# Patient Record
Sex: MALE | Race: WHITE | NOT HISPANIC OR LATINO | Employment: OTHER | URBAN - METROPOLITAN AREA
[De-identification: names, ages, dates, MRNs, and addresses within clinical notes are randomized per-mention and may not be internally consistent; named-entity substitution may affect disease eponyms.]

---

## 2021-04-13 DIAGNOSIS — Z23 ENCOUNTER FOR IMMUNIZATION: ICD-10-CM

## 2023-04-14 ENCOUNTER — APPOINTMENT (EMERGENCY)
Dept: RADIOLOGY | Facility: HOSPITAL | Age: 78
DRG: 872 | End: 2023-04-14
Payer: MEDICARE

## 2023-04-14 ENCOUNTER — HOSPITAL ENCOUNTER (INPATIENT)
Facility: HOSPITAL | Age: 78
LOS: 1 days | Discharge: HOME | DRG: 872 | End: 2023-04-16
Attending: EMERGENCY MEDICINE | Admitting: HOSPITALIST
Payer: MEDICARE

## 2023-04-14 DIAGNOSIS — R00.0 TACHYCARDIA: ICD-10-CM

## 2023-04-14 DIAGNOSIS — R50.9 FEVER, UNKNOWN ORIGIN: Primary | ICD-10-CM

## 2023-04-14 PROBLEM — N40.0 BPH (BENIGN PROSTATIC HYPERPLASIA): Status: ACTIVE | Noted: 2023-04-14

## 2023-04-14 PROBLEM — Z86.19 HISTORY OF HERPES LABIALIS: Status: ACTIVE | Noted: 2023-04-14

## 2023-04-14 PROBLEM — K52.9 ENTERITIS: Status: ACTIVE | Noted: 2023-04-14

## 2023-04-14 PROBLEM — N41.9 PROSTATITIS: Status: ACTIVE | Noted: 2021-07-01

## 2023-04-14 PROBLEM — N17.9 SEPSIS WITH ACUTE RENAL FAILURE (CMS/HCC): Status: ACTIVE | Noted: 2023-04-14

## 2023-04-14 PROBLEM — K21.9 GERD (GASTROESOPHAGEAL REFLUX DISEASE): Status: ACTIVE | Noted: 2023-04-14

## 2023-04-14 PROBLEM — A41.9 SEPSIS WITHOUT ACUTE ORGAN DYSFUNCTION (CMS/HCC): Status: ACTIVE | Noted: 2023-04-14

## 2023-04-14 PROBLEM — R03.0 ELEVATED BLOOD PRESSURE READING WITHOUT DIAGNOSIS OF HYPERTENSION: Status: ACTIVE | Noted: 2021-03-29

## 2023-04-14 PROBLEM — C61 MALIGNANT NEOPLASM OF PROSTATE (CMS/HCC): Status: ACTIVE | Noted: 2021-07-01

## 2023-04-14 PROBLEM — R65.20 SEPSIS WITH ACUTE RENAL FAILURE (CMS/HCC): Status: ACTIVE | Noted: 2023-04-14

## 2023-04-14 LAB
ALBUMIN SERPL-MCNC: 4.3 G/DL (ref 3.4–5)
ALP SERPL-CCNC: 41 IU/L (ref 35–126)
ALT SERPL-CCNC: 43 IU/L (ref 16–63)
ANION GAP SERPL CALC-SCNC: 10 MEQ/L (ref 3–15)
APTT PPP: 28 SEC (ref 23–35)
AST SERPL-CCNC: 33 IU/L (ref 15–41)
BASOPHILS # BLD: 0.02 K/UL (ref 0.01–0.1)
BASOPHILS NFR BLD: 0.2 %
BILIRUB SERPL-MCNC: 1 MG/DL (ref 0.3–1.2)
BILIRUB UR QL STRIP.AUTO: NEGATIVE MG/DL
BUN SERPL-MCNC: 12 MG/DL (ref 8–20)
CALCIUM SERPL-MCNC: 9.2 MG/DL (ref 8.9–10.3)
CHLORIDE SERPL-SCNC: 100 MEQ/L (ref 98–109)
CLARITY UR REFRACT.AUTO: CLEAR
CO2 SERPL-SCNC: 26 MEQ/L (ref 22–32)
COLOR UR AUTO: YELLOW
CREAT SERPL-MCNC: 1.3 MG/DL (ref 0.8–1.3)
DIFFERENTIAL METHOD BLD: ABNORMAL
EOSINOPHIL # BLD: 0.05 K/UL (ref 0.04–0.54)
EOSINOPHIL NFR BLD: 0.5 %
ERYTHROCYTE [DISTWIDTH] IN BLOOD BY AUTOMATED COUNT: 14 % (ref 11.6–14.4)
FLUAV RNA SPEC QL NAA+PROBE: NEGATIVE
FLUBV RNA SPEC QL NAA+PROBE: NEGATIVE
GFR SERPL CREATININE-BSD FRML MDRD: 53.5 ML/MIN/1.73M*2
GLUCOSE SERPL-MCNC: 125 MG/DL (ref 70–99)
GLUCOSE UR STRIP.AUTO-MCNC: NEGATIVE MG/DL
HCT VFR BLDCO AUTO: 53.4 % (ref 40.1–51)
HGB BLD-MCNC: 18 G/DL (ref 13.7–17.5)
HGB UR QL STRIP.AUTO: NEGATIVE
IMM GRANULOCYTES # BLD AUTO: 0.03 K/UL (ref 0–0.08)
IMM GRANULOCYTES NFR BLD AUTO: 0.3 %
INR PPP: 1.1
KETONES UR STRIP.AUTO-MCNC: NEGATIVE MG/DL
LACTATE SERPL-SCNC: 1.1 MMOL/L (ref 0.4–2)
LEUKOCYTE ESTERASE UR QL STRIP.AUTO: NEGATIVE
LYMPHOCYTES # BLD: 0.7 K/UL (ref 1.2–3.5)
LYMPHOCYTES NFR BLD: 7 %
MAGNESIUM SERPL-MCNC: 1.9 MG/DL (ref 1.8–2.5)
MCH RBC QN AUTO: 32.5 PG (ref 28–33.2)
MCHC RBC AUTO-ENTMCNC: 33.7 G/DL (ref 32.2–36.5)
MCV RBC AUTO: 96.4 FL (ref 83–98)
MONOCYTES # BLD: 0.63 K/UL (ref 0.3–1)
MONOCYTES NFR BLD: 6.3 %
NEUTROPHILS # BLD: 8.62 K/UL (ref 1.7–7)
NEUTS SEG NFR BLD: 85.7 %
NITRITE UR QL STRIP.AUTO: NEGATIVE
NRBC BLD-RTO: 0 %
PDW BLD AUTO: 8.8 FL (ref 9.4–12.4)
PH UR STRIP.AUTO: 7 [PH]
PLATELET # BLD AUTO: 181 K/UL (ref 150–350)
POTASSIUM SERPL-SCNC: 3.8 MEQ/L (ref 3.6–5.1)
PROT SERPL-MCNC: 7.2 G/DL (ref 6–8.2)
PROT UR QL STRIP.AUTO: NEGATIVE
PROTHROMBIN TIME: 14 SEC (ref 12.2–14.5)
RBC # BLD AUTO: 5.54 M/UL (ref 4.5–5.8)
RSV RNA SPEC QL NAA+PROBE: NEGATIVE
SARS-COV-2 RNA RESP QL NAA+PROBE: NEGATIVE
SODIUM SERPL-SCNC: 136 MEQ/L (ref 136–144)
SP GR UR REFRACT.AUTO: 1.02
TROPONIN I SERPL HS-MCNC: 7 PG/ML
TROPONIN I SERPL HS-MCNC: 9 PG/ML
TSH SERPL DL<=0.05 MIU/L-ACNC: 1.3 MIU/L (ref 0.34–5.6)
UROBILINOGEN UR STRIP-ACNC: 0.2 EU/DL
WBC # BLD AUTO: 10.05 K/UL (ref 3.8–10.5)

## 2023-04-14 PROCEDURE — 87637 SARSCOV2&INF A&B&RSV AMP PRB: CPT

## 2023-04-14 PROCEDURE — 83605 ASSAY OF LACTIC ACID: CPT

## 2023-04-14 PROCEDURE — G0378 HOSPITAL OBSERVATION PER HR: HCPCS

## 2023-04-14 PROCEDURE — 83735 ASSAY OF MAGNESIUM: CPT

## 2023-04-14 PROCEDURE — 63600000 HC DRUGS/DETAIL CODE

## 2023-04-14 PROCEDURE — 63600000 HC DRUGS/DETAIL CODE: Performed by: HOSPITALIST

## 2023-04-14 PROCEDURE — 25800000 HC PHARMACY IV SOLUTIONS: Performed by: HOSPITALIST

## 2023-04-14 PROCEDURE — 84443 ASSAY THYROID STIM HORMONE: CPT

## 2023-04-14 PROCEDURE — 84484 ASSAY OF TROPONIN QUANT: CPT | Mod: 91

## 2023-04-14 PROCEDURE — G1004 CDSM NDSC: HCPCS

## 2023-04-14 PROCEDURE — 96361 HYDRATE IV INFUSION ADD-ON: CPT

## 2023-04-14 PROCEDURE — 63700000 HC SELF-ADMINISTRABLE DRUG: Performed by: EMERGENCY MEDICINE

## 2023-04-14 PROCEDURE — 63600105 HC IODINE BASED CONTRAST

## 2023-04-14 PROCEDURE — 93005 ELECTROCARDIOGRAM TRACING: CPT | Performed by: HOSPITALIST

## 2023-04-14 PROCEDURE — 93005 ELECTROCARDIOGRAM TRACING: CPT

## 2023-04-14 PROCEDURE — 84484 ASSAY OF TROPONIN QUANT: CPT

## 2023-04-14 PROCEDURE — 96365 THER/PROPH/DIAG IV INF INIT: CPT | Performed by: HOSPITALIST

## 2023-04-14 PROCEDURE — 85025 COMPLETE CBC W/AUTO DIFF WBC: CPT

## 2023-04-14 PROCEDURE — 36415 COLL VENOUS BLD VENIPUNCTURE: CPT

## 2023-04-14 PROCEDURE — 99222 1ST HOSP IP/OBS MODERATE 55: CPT | Performed by: HOSPITALIST

## 2023-04-14 PROCEDURE — 81003 URINALYSIS AUTO W/O SCOPE: CPT

## 2023-04-14 PROCEDURE — 96375 TX/PRO/DX INJ NEW DRUG ADDON: CPT | Mod: 59

## 2023-04-14 PROCEDURE — 93970 EXTREMITY STUDY: CPT

## 2023-04-14 PROCEDURE — 96367 TX/PROPH/DG ADDL SEQ IV INF: CPT | Performed by: HOSPITALIST

## 2023-04-14 PROCEDURE — 80053 COMPREHEN METABOLIC PANEL: CPT

## 2023-04-14 PROCEDURE — 25800000 HC PHARMACY IV SOLUTIONS

## 2023-04-14 PROCEDURE — 85610 PROTHROMBIN TIME: CPT

## 2023-04-14 PROCEDURE — 99284 EMERGENCY DEPT VISIT MOD MDM: CPT | Mod: 25

## 2023-04-14 PROCEDURE — 63700000 HC SELF-ADMINISTRABLE DRUG: Performed by: HOSPITALIST

## 2023-04-14 PROCEDURE — 87040 BLOOD CULTURE FOR BACTERIA: CPT | Performed by: HOSPITALIST

## 2023-04-14 PROCEDURE — 96366 THER/PROPH/DIAG IV INF ADDON: CPT | Performed by: HOSPITALIST

## 2023-04-14 PROCEDURE — 85730 THROMBOPLASTIN TIME PARTIAL: CPT

## 2023-04-14 RX ORDER — TAMSULOSIN HYDROCHLORIDE 0.4 MG/1
0.4 CAPSULE ORAL NIGHTLY
Status: DISCONTINUED | OUTPATIENT
Start: 2023-04-14 | End: 2023-04-16 | Stop reason: HOSPADM

## 2023-04-14 RX ORDER — IBUPROFEN 200 MG
16-32 TABLET ORAL AS NEEDED
Status: DISCONTINUED | OUTPATIENT
Start: 2023-04-14 | End: 2023-04-16 | Stop reason: HOSPADM

## 2023-04-14 RX ORDER — SENNOSIDES 8.6 MG/1
1 TABLET ORAL 2 TIMES DAILY PRN
Status: DISCONTINUED | OUTPATIENT
Start: 2023-04-14 | End: 2023-04-16 | Stop reason: HOSPADM

## 2023-04-14 RX ORDER — OXYBUTYNIN CHLORIDE 5 MG/1
5 TABLET, EXTENDED RELEASE ORAL DAILY
Status: DISCONTINUED | OUTPATIENT
Start: 2023-04-15 | End: 2023-04-16 | Stop reason: HOSPADM

## 2023-04-14 RX ORDER — FAMOTIDINE 20 MG/1
20 TABLET, FILM COATED ORAL NIGHTLY
Status: DISCONTINUED | OUTPATIENT
Start: 2023-04-14 | End: 2023-04-16 | Stop reason: HOSPADM

## 2023-04-14 RX ORDER — ACETAMINOPHEN 325 MG/1
975 TABLET ORAL ONCE
Status: COMPLETED | OUTPATIENT
Start: 2023-04-14 | End: 2023-04-14

## 2023-04-14 RX ORDER — SODIUM CHLORIDE, SODIUM LACTATE, POTASSIUM CHLORIDE, CALCIUM CHLORIDE 600; 310; 30; 20 MG/100ML; MG/100ML; MG/100ML; MG/100ML
INJECTION, SOLUTION INTRAVENOUS CONTINUOUS
Status: ACTIVE | OUTPATIENT
Start: 2023-04-14 | End: 2023-04-15

## 2023-04-14 RX ORDER — ALUMINUM HYDROXIDE, MAGNESIUM HYDROXIDE, AND SIMETHICONE 1200; 120; 1200 MG/30ML; MG/30ML; MG/30ML
30 SUSPENSION ORAL EVERY 4 HOURS PRN
Status: DISCONTINUED | OUTPATIENT
Start: 2023-04-14 | End: 2023-04-16 | Stop reason: HOSPADM

## 2023-04-14 RX ORDER — ACYCLOVIR 800 MG/1
800 TABLET ORAL DAILY
Status: DISCONTINUED | OUTPATIENT
Start: 2023-04-15 | End: 2023-04-16 | Stop reason: HOSPADM

## 2023-04-14 RX ORDER — ACETAMINOPHEN 325 MG/1
650 TABLET ORAL EVERY 4 HOURS PRN
Status: DISCONTINUED | OUTPATIENT
Start: 2023-04-14 | End: 2023-04-16 | Stop reason: HOSPADM

## 2023-04-14 RX ORDER — DEXTROSE 40 %
15-30 GEL (GRAM) ORAL AS NEEDED
Status: DISCONTINUED | OUTPATIENT
Start: 2023-04-14 | End: 2023-04-16 | Stop reason: HOSPADM

## 2023-04-14 RX ORDER — ONDANSETRON 4 MG/1
4 TABLET, ORALLY DISINTEGRATING ORAL EVERY 6 HOURS PRN
Status: DISCONTINUED | OUTPATIENT
Start: 2023-04-14 | End: 2023-04-16 | Stop reason: HOSPADM

## 2023-04-14 RX ORDER — ONDANSETRON HYDROCHLORIDE 2 MG/ML
4 INJECTION, SOLUTION INTRAVENOUS EVERY 6 HOURS PRN
Status: DISCONTINUED | OUTPATIENT
Start: 2023-04-14 | End: 2023-04-16 | Stop reason: HOSPADM

## 2023-04-14 RX ORDER — TADALAFIL 20 MG/1
20 TABLET ORAL DAILY PRN
Status: ON HOLD | COMMUNITY
End: 2023-04-14

## 2023-04-14 RX ORDER — ENOXAPARIN SODIUM 100 MG/ML
40 INJECTION SUBCUTANEOUS
Status: DISCONTINUED | OUTPATIENT
Start: 2023-04-15 | End: 2023-04-16 | Stop reason: HOSPADM

## 2023-04-14 RX ORDER — OXYBUTYNIN CHLORIDE 5 MG/1
5 TABLET, EXTENDED RELEASE ORAL DAILY
COMMUNITY

## 2023-04-14 RX ORDER — DEXTROSE 50 % IN WATER (D50W) INTRAVENOUS SYRINGE
25 AS NEEDED
Status: DISCONTINUED | OUTPATIENT
Start: 2023-04-14 | End: 2023-04-16 | Stop reason: HOSPADM

## 2023-04-14 RX ORDER — DIPHENHYDRAMINE HCL 25 MG
25 CAPSULE ORAL EVERY 6 HOURS PRN
Status: DISCONTINUED | OUTPATIENT
Start: 2023-04-14 | End: 2023-04-16 | Stop reason: HOSPADM

## 2023-04-14 RX ORDER — ONDANSETRON HYDROCHLORIDE 2 MG/ML
4 INJECTION, SOLUTION INTRAVENOUS ONCE
Status: COMPLETED | OUTPATIENT
Start: 2023-04-14 | End: 2023-04-14

## 2023-04-14 RX ORDER — ACYCLOVIR 400 MG/1
800 TABLET ORAL DAILY
COMMUNITY

## 2023-04-14 RX ORDER — TAMSULOSIN HYDROCHLORIDE 0.4 MG/1
0.4 CAPSULE ORAL NIGHTLY
COMMUNITY

## 2023-04-14 RX ORDER — FAMOTIDINE 20 MG/1
20 TABLET, FILM COATED ORAL NIGHTLY
COMMUNITY

## 2023-04-14 RX ADMIN — SODIUM CHLORIDE 1000 ML: 9 INJECTION, SOLUTION INTRAVENOUS at 13:58

## 2023-04-14 RX ADMIN — SODIUM CHLORIDE 1000 ML: 9 INJECTION, SOLUTION INTRAVENOUS at 17:56

## 2023-04-14 RX ADMIN — SODIUM CHLORIDE, POTASSIUM CHLORIDE, SODIUM LACTATE AND CALCIUM CHLORIDE: 600; 310; 30; 20 INJECTION, SOLUTION INTRAVENOUS at 23:12

## 2023-04-14 RX ADMIN — SODIUM CHLORIDE, POTASSIUM CHLORIDE, SODIUM LACTATE AND CALCIUM CHLORIDE 1000 ML: 600; 310; 30; 20 INJECTION, SOLUTION INTRAVENOUS at 21:17

## 2023-04-14 RX ADMIN — ACETAMINOPHEN 975 MG: 325 TABLET ORAL at 17:54

## 2023-04-14 RX ADMIN — ONDANSETRON 4 MG: 2 INJECTION INTRAMUSCULAR; INTRAVENOUS at 13:58

## 2023-04-14 RX ADMIN — IOHEXOL 100 ML: 350 INJECTION, SOLUTION INTRAVENOUS at 16:24

## 2023-04-14 RX ADMIN — FAMOTIDINE 20 MG: 20 TABLET, FILM COATED ORAL at 21:17

## 2023-04-14 RX ADMIN — VANCOMYCIN HYDROCHLORIDE 1000 MG: 1 INJECTION, POWDER, LYOPHILIZED, FOR SOLUTION INTRAVENOUS at 22:11

## 2023-04-14 RX ADMIN — CEFTAZIDIME 2 G: 2 INJECTION, POWDER, FOR SOLUTION INTRAVENOUS at 21:15

## 2023-04-14 ASSESSMENT — ENCOUNTER SYMPTOMS
HEMATURIA: 0
DIAPHORESIS: 0
LIGHT-HEADEDNESS: 0
DIZZINESS: 0
CHILLS: 0
NAUSEA: 1
WEAKNESS: 1
ABDOMINAL PAIN: 0
SHORTNESS OF BREATH: 0
FREQUENCY: 0
FATIGUE: 0
RHINORRHEA: 0
NERVOUS/ANXIOUS: 0
FLANK PAIN: 0
SORE THROAT: 0
NUMBNESS: 0
DYSURIA: 0
BACK PAIN: 0
DIARRHEA: 0
HEADACHES: 0
VOMITING: 0
SINUS PRESSURE: 0
FEVER: 0
COUGH: 0

## 2023-04-14 NOTE — H&P
"   Hospital Medicine Service -  History & Physical        CHIEF COMPLAINT   \"I had shaking chills.\"   HISTORY OF PRESENT ILLNESS      Saeid Owens is a 78 y.o. 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      Pt. States that last night pt. Was feeling nauseated all night. He had eaten dinner and walked the dog as usual. This morning, he was extremely nauseated. He tried to vomit, but he couldn't.  He was very unsteady on his feet and was unsteady and he had shaking chills and tremors in his feet and his legs.  + LH.  He did not take his temperature at home but had subjective fevers.  + chronic rhinorrhea, unchanged.  No cough.  No known sick contacts other than wife who had URI about 3 weeks ago. No chest pain, shortness of breath, palpitations.  No diarrhea, last bowel movement was yesterday morning and normal.  Pt. Did not eat or drink anything today due to his severe nausea.  He does not recall any nadine stomach pain.  No headache or neck stiffness.  No visualized rashes. Pt's nausea markedly improved with Zofran in the ED.     Pt. Had colonoscopy and endoscopy 3/16/2023.  There was a small polyp that was removed, otherwise NAD.    Pt. Had a cortisone shot 2 weeks ago in the shoulder for a muscle strain by Dr. Fernández. He was supposed to get an MRI but it felt entirely better so he did not pursue.    Pt. Had transiently worsening LUTs a few months ago, doubled his tamsulosin for a few weeks with improvement, then reduced this back to 0.4 mg qhs.    No recent travel.    Hx L knee TKA 7.5 years ago, hx R inguinal hernia surgery age 51, hx L inguinal hernia " surgery at 8 years old    In ED, pt. Received Vanc/Fortaz    EP was called to r/o Aflutter but felt EKG represented sinus tach; is very labile    Pt. Had salmon last night and whole family ate it - no one else was sick.  Last time he ate out was sushi one week ago.    Pt. reports that he has never smoked. He has never used smokeless tobacco. He reports drinking a beer once a week at most. He reports that he does not use drugs.  Did do some experimentation in the 1970's.    Pt. Is a , started out as criminal, now works as personal injury .    Father prostate cancer age 80, lived until almost 90, mother thyroid disease, lived until 91.5.    He received total 2 L IVF and Zofran 4 mg IV x 1, 975 mg acetaminophen, and Vanc/Ceftaz x 1.    PAST MEDICAL AND SURGICAL HISTORY      No past medical history on file.    No past surgical history on file.    PCP: James Vences MD    MEDICATIONS      Prior to Admission medications    Not on File       ALLERGIES      Erythromycin and Sulfa (sulfonamide antibiotics)    FAMILY HISTORY      No family history on file.    SOCIAL HISTORY      Social History     Socioeconomic History   • Marital status:      Social Determinants of Health     Food Insecurity: Unknown (4/14/2023)    Hunger Vital Sign    • Worried About Running Out of Food in the Last Year: Patient refused    • Ran Out of Food in the Last Year: Patient refused       REVIEW OF SYSTEMS      All other systems reviewed and negative except as noted in HPI    PHYSICAL EXAMINATION      Temp:  [36.3 °C (97.4 °F)-39.6 °C (103.2 °F)] 37.7 °C (99.8 °F)  Heart Rate:  [104-145] 104  Resp:  [14-35] 20  BP: ()/(54-73) 92/54  Body mass index is 23.96 kg/m².    Physical Exam     Gen: WDWN non-toxic appearing male appears younger than stated age lying semi-recumbent in stretcher, NAD  Vitals: 97.4, 145--> 104, 156/71--> 105/57, 100% on RA  HEENT: NCAT, PERRL, EOMI, dry mm, neck supple  CV: tachycardic, regular,  +S1, +S2, no m/r/g - hr increases when sits upright  Pulm: CTA b/l  Abd: soft, NT, ND, +bs  Ext: wwp, no c/c/e  Msk: R shoulder with full ROM, no redness or warmth, no tenderness over T/C/L spine, no warmth or redness over L TKA and no e/o effusion  Neuro: AAOx3, nonfocal  Psych: pleasant, cooperative  : no ritter, no CVA tenderness  Derm: no rashes    LABS / IMAGING / EKG        Labs  Labs: U/A neg, K 3.8, Cr 1.3 (1.0 at baseline), LFTs wnl, lactate 1.1, hs trop 9--> 7, TSH 1.3, WBC 10.05, Hgb 18, plts 181, INR 1.1, Flu A/B/RSV/SARS-CoV-2 neg    Imaging  CTA C/A/P: IMPRESSION: There are no evidence of pulmonary embolic disease.  There is no consolidation or pleural fluid.     There are few mildly dilated fluid-filled proximal small bowel loops with  gradual transition to normal caliber. Findings could represent a mild ileus,  mild enteritis. No findings for bowel obstruction or other acute bowel or  mesenteric process.     Mild fullness of the renal collecting system and ureter is noted bilaterally,  noting that the urinary bladder is moderately distended. This could be a  reflection of the patient's state of hydration, or could represent mild  vesicoureteric reflux.     *Incidental bilateral adrenal body masses which measure up to 1.5 cm diameter.  Follow-up imaging is recommended. Consider further characterization with adrenal  protocol MRI, as is completed with and without intravenous gadolinium.    B/l LE dopplers: No sonographic evidence of acute deep venous thrombosis in the femoral-popliteal system bilaterally.      SARS-CoV-2 (COVID-19) (no units)   Date/Time Value   04/14/2023 1811 Negative       ECG/Telemetry  EKG: sinus tach, R superior axis deviation, incomplete RBBB, RVH, poor R wave compreson    ASSESSMENT AND PLAN           * Sepsis with acute renal failure (CMS/HCC)  Assessment & Plan  - with fever and tachycardia, no leukocytosis but lymphopenia, and non-toxic appearance  - suspect given CT scan  and symptoms that pt. Has an enteritis, unclear if viral or bacterial but suspect former  - s/p Vanc/Ceftaz in ED  - no history of resistant organisms or immunosuppressed state  - pt. Remains tachycardic with lower Bps; will continue to volume resuscitate  - given progressive hypotension, will continue to cover with Unasyn but can likely discontinue in am if clinically improved; suspect this may just be due to underresuscitation given hemoconcentration  - DEEP - Cr 1.3 from b/l 1.0 - suspect due to hypovolemia and sepsis - trend with volume resuscitation  - f/u culture data    History of herpes labialis  Assessment & Plan  - cont suppression with acyclovir, no rashes    GERD (gastroesophageal reflux disease)  Assessment & Plan  - with recent unrevealing EGD/colo  - cont qhs famotidine    BPH (benign prostatic hyperplasia)  Assessment & Plan  - with hx prostate ca s/p radiation therapy in remission  - cont oxybutinin qam and tamsulosin qhs, the latter with hold parameters for blood pressure    Enteritis  Assessment & Plan  - cont Zofran PRN, clears, ADAT  - cont Unasyn for  now       VTE Assessment: Padua    VTE Prophylaxis: Current anticoagulants:    •None      Code Status: Full Code      Discussed advanced care planning. Pt. Is full code and goals of care are restorative.  Estimated Discharge Date: 4/16/2023  Disposition Planning: home 48 hours     Cindy Ferguson MD  4/14/2023

## 2023-04-14 NOTE — ED PROVIDER NOTES
Emergency Medicine Note  HPI   HISTORY OF PRESENT ILLNESS     78-year-old male with PMH of prostate cancer status post radiation therapy (9/21/2021) presenting for nausea, weakness and shakiness.  Patient states that suddenly today he developed nausea, shakiness.  Patient also states he had lower extremity cramps which have since resolved.  Patient denies abdominal pain, flank pain or urinary symptoms.  Denies chest pain or shortness of breath.  Denies cardiac disease history, history of A-fib.  Denies pleuritic chest pain.  No personal history of VTE.      History provided by:  Patient and medical records   used: No          Patient History   PAST HISTORY     Reviewed from Nursing Triage:       No past medical history on file.    No past surgical history on file.    No family history on file.           Review of Systems   REVIEW OF SYSTEMS     Review of Systems   Constitutional: Negative for chills, diaphoresis, fatigue and fever.   HENT: Negative for congestion, rhinorrhea, sinus pressure and sore throat.    Eyes: Negative for visual disturbance.   Respiratory: Negative for cough and shortness of breath.    Cardiovascular: Negative for chest pain and leg swelling.   Gastrointestinal: Positive for nausea. Negative for abdominal pain, diarrhea and vomiting.   Genitourinary: Negative for dysuria, flank pain, frequency and hematuria.   Musculoskeletal: Negative for back pain.   Skin: Negative for rash.   Neurological: Positive for weakness. Negative for dizziness, light-headedness, numbness and headaches.   Psychiatric/Behavioral: The patient is not nervous/anxious.          VITALS     ED Vitals    Date/Time Temp Pulse Resp BP SpO2 [facility]   04/14/23 1359 -- 126 25 128/68 97 % [facility]   04/14/23 1330 -- -- 25 127/68 100 % [facility]   04/14/23 1307 36.3 °C (97.4 °F) 145 22 156/71 100 % [facility]                       Physical Exam   PHYSICAL EXAM     Physical Exam  Vitals and nursing note reviewed.   Constitutional:        Appearance: He is well-developed.   HENT:      Head: Normocephalic and atraumatic.   Eyes:      Conjunctiva/sclera: Conjunctivae normal.   Cardiovascular:      Rate and Rhythm: Regular rhythm. Tachycardia present.   Pulmonary:      Effort: Pulmonary effort is normal.      Breath sounds: Normal breath sounds.   Abdominal:      General: There is no distension.      Palpations: Abdomen is soft. There is no mass.      Tenderness: There is no abdominal tenderness.   Musculoskeletal:         General: No tenderness or deformity. Normal range of motion.      Cervical back: Normal range of motion.      Right lower leg: No edema.      Left lower leg: No edema.   Skin:     General: Skin is warm and dry.   Neurological:      Mental Status: He is alert. Mental status is at baseline.   Psychiatric:         Behavior: Behavior normal.           PROCEDURES     Procedures     DATA     Results     Procedure Component Value Units Date/Time    Magnesium [[id number]] Collected: 04/14/23 1321    Specimen: Blood, Venous Updated: 04/14/23 1406    TSH w reflex FT4 [[id number]] Collected: 04/14/23 1321    Specimen: Blood, Venous Updated: 04/14/23 1406    HS Troponin (with 2 hour reflex) [[id number]]  (Normal) Collected: 04/14/23 1321    Specimen: Blood, Venous Updated: 04/14/23 1404     High Sens Troponin I 9.0 pg/mL     Comprehensive metabolic panel [[id number]]  (Abnormal) Collected: 04/14/23 1321    Specimen: Blood, Venous Updated: 04/14/23 1401     Sodium 136 mEQ/L      Potassium 3.8 mEQ/L      Comment: Results obtained on plasma. Plasma Potassium values may be up to 0.4 mEQ/L less than serum values. The differences may be greater for patients with high platelet or white cell counts.        Chloride 100 mEQ/L      CO2 26 mEQ/L      BUN 12 mg/dL      Creatinine 1.3 mg/dL      Glucose 125 mg/dL      Calcium 9.2 mg/dL      AST (SGOT) 33 IU/L      ALT (SGPT) 43 IU/L      Alkaline Phosphatase 41 IU/L      Total Protein 7.2 g/dL      Comment: Test  performed on plasma which typically contains approximately 0.4 g/dL more protein than serum.        Albumin 4.3 g/dL      Bilirubin, Total 1.0 mg/dL      eGFR 53.5 mL/min/1.73m*2      Anion Gap 10 mEQ/L     CBC and differential [[id number]]  (Abnormal) Collected: 04/14/23 1321    Specimen: Blood, Venous Updated: 04/14/23 1333     WBC 10.05 K/uL      RBC 5.54 M/uL      Hemoglobin 18.0 g/dL      Hematocrit 53.4 %      MCV 96.4 fL      MCH 32.5 pg      MCHC 33.7 g/dL      RDW 14.0 %      Platelets 181 K/uL      MPV 8.8 fL      Differential Type Auto     nRBC 0.0 %      Immature Granulocytes 0.3 %      Neutrophils 85.7 %      Lymphocytes 7.0 %      Monocytes 6.3 %      Eosinophils 0.5 %      Basophils 0.2 %      Immature Granulocytes, Absolute 0.03 K/uL      Neutrophils, Absolute 8.62 K/uL      Lymphocytes, Absolute 0.70 K/uL      Monocytes, Absolute 0.63 K/uL      Eosinophils, Absolute 0.05 K/uL      Basophils, Absolute 0.02 K/uL     RAINBOW LT BLUE [[id number]] Collected: 04/14/23 1321    Specimen: Blood, Venous Updated: 04/14/23 1329    [facility] Draw Panel [[id number]] Collected: 04/14/23 1321    Specimen: Blood, Venous Updated: 04/14/23 1328    Narrative:      The following orders were created for panel order [facility] Draw Panel.  Procedure                               Abnormality         Status                     ---------                               -----------         ------                     RAINBOW PINK[[id number]]                                      In process                 RAINBOW LT BLUE[[id number]]                                   In process                   Please view results for these tests on the individual orders.    RAINBOW PINK [[id number]] Collected: 04/14/23 1321    Specimen: Blood, Venous Updated: 04/14/23 1328          Imaging Results    None         No orders to display       Scoring tools                                  ED Course & MDM   MDM / ED COURSE / CLINICAL IMPRESSION / DISPO     Medical  Decision Making  78-year-old male presenting for weakness, lower extremity cramping and shakiness.  Exam, patient is tachycardic and fatigued appearing.  Afebrile.  Lungs are clear to auscultation.  ECG with right bundle branch block pattern, tachycardia T wave inversions V1/V2.  Concern for possible PE given patient's history of malignancy.  Will scan chest and also do lower extremity Dopplers.  Less likely cardiac arrhythmia, sepsis picture, however given history of prostate cancer and nausea will also do CT scan of abdomen and check urine.  Also check coags, basic labs, troponin.  Patient given fluids.  Discussed with attending    Fever, unknown origin: acute illness or injury  Tachycardia: acute illness or injury  Amount and/or Complexity of Data Reviewed  Independent Historian: spouse  External Data Reviewed: notes.     Details: urology notes 2/2023  Labs: ordered. Decision-making details documented in ED Course.  Radiology: ordered and independent interpretation performed. Decision-making details documented in ED Course.  ECG/medicine tests: ordered and independent interpretation performed. Decision-making details documented in ED Course.      Risk  OTC drugs.  Prescription drug management.  Decision regarding hospitalization.          ED Course as of 04/14/23 1902 Fri Apr 14, 2023   1429 High Sens Troponin I: 9.0 [SB]   1429 Magnesium: 1.9 [SB]   1429 WBC: 10.05 [SB]   1430 My independent ECG interpretation: sinus tachycardia at 145 bpm. Right axis deviation. RBBB. RVH. T wave inversions V1/V2. Normal P waves, normal IA intervals, normal QRS segments, normal R-wave progression,  [SB]   1612 High Sens Troponin I: 9.0 [SB]   1636 High Sens Troponin I: 7.0 [SB]   1730 Independently reviewed imaging. Agree with radiology review below  --  IMPRESSION: There are no evidence of pulmonary embolic disease.  There is no consolidation or pleural fluid.     There are few mildly dilated fluid-filled proximal small bowel  loops with  gradual transition to normal caliber. Findings could represent a mild ileus,  mild enteritis. No findings for bowel obstruction or other acute bowel or  mesenteric process.     Mild fullness of the renal collecting system and ureter is noted bilaterally,  noting that the urinary bladder is moderately distended. This could be a  reflection of the patient's state of hydration, or could represent mild  vesicoureteric reflux.     *Incidental bilateral adrenal body masses which measure up to 1.5 cm diameter.  Follow-up imaging is recommended. Consider further characterization with adrenal  protocol MRI, as is completed with and without intravenous gadolinium.    [SB]   1735 No PE on CT scan, did fine mild fullness of the renal collecting system and distention of urinary bladder.  Will check bladder scan.  UA still pending.  We will repeat ECG now that heart rate is slower to ensure there is no arrhythmia. [SB]   1739 Signout to JOSE Finch at change of shift.  Patient received bladder scan, repeat ECG.  UA pending.  If unremarkable, plan to admit patient for tachycardia and EP eval. [SB]   1740 Repeat ECG sinus tachycardia at 133 bpm.  Unchanged from prior. [SB]   1754 Pt now febrile to 103.2, will give tylenol, receiving fluids. Will check lactate. Will start abx, likely urinary source [SB]   1902 Waiting for viral swab to come back before further intervention. [MAMTA]      ED Course User Index  [MAMTA] Stef Han, ROYER  [SB] Genoveva Patel PA C     Clinical Impression      None               Genoveva Patel PA C  04/23/23 1510

## 2023-04-15 LAB
ANION GAP SERPL CALC-SCNC: 11 MEQ/L (ref 3–15)
ATRIAL RATE: 133
ATRIAL RATE: 145
BUN SERPL-MCNC: 9 MG/DL (ref 8–20)
CALCIUM SERPL-MCNC: 8 MG/DL (ref 8.9–10.3)
CHLORIDE SERPL-SCNC: 107 MEQ/L (ref 98–109)
CO2 SERPL-SCNC: 21 MEQ/L (ref 22–32)
CREAT SERPL-MCNC: 1 MG/DL (ref 0.8–1.3)
ERYTHROCYTE [DISTWIDTH] IN BLOOD BY AUTOMATED COUNT: 14.3 % (ref 11.6–14.4)
GFR SERPL CREATININE-BSD FRML MDRD: >60 ML/MIN/1.73M*2
GLUCOSE SERPL-MCNC: 91 MG/DL (ref 70–99)
HCT VFR BLDCO AUTO: 42.1 % (ref 40.1–51)
HGB BLD-MCNC: 14.3 G/DL (ref 13.7–17.5)
MCH RBC QN AUTO: 32.7 PG (ref 28–33.2)
MCHC RBC AUTO-ENTMCNC: 34 G/DL (ref 32.2–36.5)
MCV RBC AUTO: 96.3 FL (ref 83–98)
P AXIS: 31
PDW BLD AUTO: 9.1 FL (ref 9.4–12.4)
PLATELET # BLD AUTO: 142 K/UL (ref 150–350)
POTASSIUM SERPL-SCNC: 3.9 MEQ/L (ref 3.6–5.1)
PR INTERVAL: 120
PR INTERVAL: 158
QRS DURATION: 92
QRS DURATION: 92
QT INTERVAL: 288
QT INTERVAL: 302
QTC CALCULATION(BAZETT): 428
QTC CALCULATION(BAZETT): 469
R AXIS: 235
R AXIS: 240
RBC # BLD AUTO: 4.37 M/UL (ref 4.5–5.8)
SODIUM SERPL-SCNC: 139 MEQ/L (ref 136–144)
T WAVE AXIS: 38
T WAVE AXIS: 62
VENTRICULAR RATE: 133
VENTRICULAR RATE: 145
WBC # BLD AUTO: 6.69 K/UL (ref 3.8–10.5)

## 2023-04-15 PROCEDURE — 96372 THER/PROPH/DIAG INJ SC/IM: CPT | Mod: 59 | Performed by: HOSPITALIST

## 2023-04-15 PROCEDURE — 25800000 HC PHARMACY IV SOLUTIONS: Performed by: HOSPITALIST

## 2023-04-15 PROCEDURE — 96367 TX/PROPH/DG ADDL SEQ IV INF: CPT | Performed by: HOSPITALIST

## 2023-04-15 PROCEDURE — 80048 BASIC METABOLIC PNL TOTAL CA: CPT | Performed by: HOSPITALIST

## 2023-04-15 PROCEDURE — G0378 HOSPITAL OBSERVATION PER HR: HCPCS

## 2023-04-15 PROCEDURE — 63600000 HC DRUGS/DETAIL CODE: Performed by: HOSPITALIST

## 2023-04-15 PROCEDURE — 96361 HYDRATE IV INFUSION ADD-ON: CPT | Performed by: HOSPITALIST

## 2023-04-15 PROCEDURE — 36415 COLL VENOUS BLD VENIPUNCTURE: CPT | Performed by: HOSPITALIST

## 2023-04-15 PROCEDURE — 93010 ELECTROCARDIOGRAM REPORT: CPT | Performed by: INTERNAL MEDICINE

## 2023-04-15 PROCEDURE — 93010 ELECTROCARDIOGRAM REPORT: CPT | Mod: 76 | Performed by: INTERNAL MEDICINE

## 2023-04-15 PROCEDURE — 96366 THER/PROPH/DIAG IV INF ADDON: CPT | Performed by: HOSPITALIST

## 2023-04-15 PROCEDURE — 63700000 HC SELF-ADMINISTRABLE DRUG: Performed by: HOSPITALIST

## 2023-04-15 PROCEDURE — 85027 COMPLETE CBC AUTOMATED: CPT | Performed by: HOSPITALIST

## 2023-04-15 PROCEDURE — 99233 SBSQ HOSP IP/OBS HIGH 50: CPT | Performed by: HOSPITALIST

## 2023-04-15 RX ORDER — SODIUM CHLORIDE, SODIUM LACTATE, POTASSIUM CHLORIDE, CALCIUM CHLORIDE 600; 310; 30; 20 MG/100ML; MG/100ML; MG/100ML; MG/100ML
INJECTION, SOLUTION INTRAVENOUS CONTINUOUS
Status: ACTIVE | OUTPATIENT
Start: 2023-04-15 | End: 2023-04-16

## 2023-04-15 RX ADMIN — SODIUM CHLORIDE, POTASSIUM CHLORIDE, SODIUM LACTATE AND CALCIUM CHLORIDE: 600; 310; 30; 20 INJECTION, SOLUTION INTRAVENOUS at 14:19

## 2023-04-15 RX ADMIN — AMPICILLIN AND SULBACTAM 3 G: 1; 2 INJECTION, POWDER, FOR SOLUTION INTRAMUSCULAR; INTRAVENOUS at 16:14

## 2023-04-15 RX ADMIN — AMPICILLIN AND SULBACTAM 3 G: 1; 2 INJECTION, POWDER, FOR SOLUTION INTRAMUSCULAR; INTRAVENOUS at 04:56

## 2023-04-15 RX ADMIN — SODIUM CHLORIDE, POTASSIUM CHLORIDE, SODIUM LACTATE AND CALCIUM CHLORIDE: 600; 310; 30; 20 INJECTION, SOLUTION INTRAVENOUS at 06:40

## 2023-04-15 RX ADMIN — ENOXAPARIN SODIUM 40 MG: 40 INJECTION SUBCUTANEOUS at 17:59

## 2023-04-15 RX ADMIN — AMPICILLIN AND SULBACTAM 3 G: 1; 2 INJECTION, POWDER, FOR SOLUTION INTRAMUSCULAR; INTRAVENOUS at 22:54

## 2023-04-15 RX ADMIN — OXYBUTYNIN CHLORIDE 5 MG: 5 TABLET, EXTENDED RELEASE ORAL at 09:51

## 2023-04-15 RX ADMIN — AMPICILLIN AND SULBACTAM 3 G: 1; 2 INJECTION, POWDER, FOR SOLUTION INTRAMUSCULAR; INTRAVENOUS at 09:52

## 2023-04-15 RX ADMIN — FAMOTIDINE 20 MG: 20 TABLET, FILM COATED ORAL at 22:54

## 2023-04-15 RX ADMIN — ACYCLOVIR 800 MG: 800 TABLET ORAL at 09:51

## 2023-04-15 RX ADMIN — SODIUM CHLORIDE, POTASSIUM CHLORIDE, SODIUM LACTATE AND CALCIUM CHLORIDE: 600; 310; 30; 20 INJECTION, SOLUTION INTRAVENOUS at 22:54

## 2023-04-15 RX ADMIN — TAMSULOSIN HYDROCHLORIDE 0.4 MG: 0.4 CAPSULE ORAL at 22:54

## 2023-04-15 NOTE — PROGRESS NOTES
Hospital Medicine Service -  Daily Progress Note       SUBJECTIVE   Interval History: No acute overnight events. Patient reports nausea has resolved. Asking for a regular diet.      OBJECTIVE      Vital signs in last 24 hours:  Temp:  [36.4 °C (97.5 °F)-39.6 °C (103.2 °F)] 37.2 °C (98.9 °F)  Heart Rate:  [] 73  Resp:  [16-21] 18  BP: ()/(50-73) 115/59  No intake or output data in the 24 hours ending 04/15/23 1724    PHYSICAL EXAMINATION      Physical Exam  Vitals and nursing note reviewed.   Constitutional:       General: He is not in acute distress.     Appearance: Normal appearance. He is well-developed.   HENT:      Head: Normocephalic and atraumatic.   Eyes:      Extraocular Movements: Extraocular movements intact.      Pupils: Pupils are equal, round, and reactive to light.   Cardiovascular:      Rate and Rhythm: Normal rate and regular rhythm.      Heart sounds: Normal heart sounds.   Pulmonary:      Effort: Pulmonary effort is normal. No respiratory distress.      Breath sounds: Normal breath sounds. No wheezing, rhonchi or rales.   Abdominal:      General: Bowel sounds are normal. There is no distension.      Palpations: Abdomen is soft.      Tenderness: There is no abdominal tenderness.   Musculoskeletal:         General: No swelling. Normal range of motion.      Cervical back: Normal range of motion and neck supple.   Skin:     General: Skin is warm and dry.   Neurological:      Mental Status: He is alert and oriented to person, place, and time.            LINES, CATHETERS, DRAINS, AIRWAYS, AND WOUNDS   Lines, Drains, and Airways:  Wounds (agree with documentation and present on admission):  Peripheral IV (Adult) 04/14/23 Left Antecubital (Active)   Number of days: 1         Comments:      LABS / IMAGING / TELE      Labs  Results from last 7 days   Lab Units 04/15/23  0548   WBC K/uL 6.69   HEMOGLOBIN g/dL 14.3   HEMATOCRIT % 42.1   PLATELETS K/uL 142*     Results from last 7 days   Lab  Units 04/15/23  0548   SODIUM mEQ/L 139   POTASSIUM mEQ/L 3.9   CHLORIDE mEQ/L 107   CO2 mEQ/L 21*   BUN mg/dL 9   CREATININE mg/dL 1.0   GLUCOSE mg/dL 91   CALCIUM mg/dL 8.0*     SARS-CoV-2 (COVID-19) (no units)   Date/Time Value   04/14/2023 1811 Negative       Imaging  I have independently reviewed the pertinent imaging from the last 24 hrs.    ECG/Telemetry  I have independently reviewed the telemetry. No events for the last 24 hours.    ASSESSMENT AND PLAN      * Sepsis with acute renal failure (CMS/HCC)  Assessment & Plan  - Fever and tachycardia, no leukocytosis but lymphopenia, and non-toxic appearance. No fever since last night. Symptoms now resolved.   - UA negative. CT A/P shows mild enteritis.   - Blood cx pending.   - Can continue Unasyn for now.   - Advance diet.   - DEEP resolving. Continue IV fluids.   - Check bladder scan. Trend BMP.    History of herpes labialis  Assessment & Plan  - cont suppression with acyclovir, no rashes    GERD (gastroesophageal reflux disease)  Assessment & Plan  - with recent unrevealing EGD/colo  - cont qhs famotidine    BPH (benign prostatic hyperplasia)  Assessment & Plan  - with hx prostate ca s/p radiation therapy in remission  - cont oxybutinin qam and tamsulosin qhs, the latter with hold parameters for blood pressure    Enteritis  Assessment & Plan  - See sepsis       VTE Assessment: Padua    VTE Prophylaxis:  Current anticoagulants:  enoxaparin (LOVENOX) syringe 40 mg, subcutaneous, Daily (6p)      Code Status: Full Code      Estimated Discharge Date: 4/16/2023     Disposition Planning: Home     Joellen Christie, DO  4/15/2023

## 2023-04-15 NOTE — PROGRESS NOTES
Spoke with patient and confirmed with medication list from [facility] and/or patient's own pharmacy to complete the medication reconciliation.     Prior to admission medication list:    Current Outpatient Medications:   •  acyclovir (ZOVIRAX) 400 mg tablet, Take 800 mg by mouth daily.  •  famotidine (PEPCID) 20 mg tablet, Take 20 mg by mouth nightly.  •  oxybutynin XL (DITROPAN-XL) 5 mg 24 hr tablet, Take 5 mg by mouth daily.  •  tadalafiL (CIALIS) 20 mg tablet, Take 20 mg by mouth daily as needed for erectile dysfunction.  •  tamsulosin (FLOMAX) 0.4 mg capsule, Take 0.4 mg by mouth nightly.    Comments about home medications:  -Patient states he takes acyclovir 2 tablets (800mg) once daily.  -Patient states he is taking tamsulosin 1 capsule nightly, not twice daily as surescripts states.    Compliance:   -Consistent fills, no compliance concerns based on patient interview

## 2023-04-15 NOTE — DISCHARGE INSTRUCTIONS
CT A/P showed incidental bilateral adrenal body masses which measure up to 1.5 cm diameter. Follow-up imaging is recommended. Consider further characterization with adrenal protocol MRI, as is completed with and without intravenous gadolinium. Follow up with your primary care doctor for follow-up imaging.     You were admitted to the hospital with nausea and fever due to inflammation in your small bowel (enteritis). You were treated with IV fluids and antibiotics with resolution of your symptoms. Continue taking the antibiotic Augmentin as prescribed.     Please continue taking the rest of your medications as prescribed.     Please follow up with your primary care physician within 7 days of discharge. If you develop worsening shortness of breath, chest pain, palpitations, or feel like you are going to pass out, please seek medical attention immediately.     Thank you for choosing [facility]. It was a pleasure taking care of you.

## 2023-04-15 NOTE — PLAN OF CARE
Plan of Care Review  Plan of Care Reviewed With: patient  Progress: improving  Outcome Summary: pt A&Ox4, IVF and IV abx per MAR. pt denies pain this shift.

## 2023-04-15 NOTE — ASSESSMENT & PLAN NOTE
- with hx prostate ca s/p radiation therapy in remission  - cont oxybutinin qam and tamsulosin qhs

## 2023-04-15 NOTE — ASSESSMENT & PLAN NOTE
- Afebrile x24 hours. HR back to normal. No leukocytosis. Symptoms resolved.   - UA negative. CT A/P shows mild enteritis.   - Blood cx no growth. DEEP resolved.   - Tolerating regular diet without nausea.   - Switch to Augmentin to complete 5-day course.  F/U with PCP.

## 2023-04-16 VITALS
RESPIRATION RATE: 18 BRPM | OXYGEN SATURATION: 95 % | DIASTOLIC BLOOD PRESSURE: 77 MMHG | SYSTOLIC BLOOD PRESSURE: 129 MMHG | WEIGHT: 160.7 LBS | TEMPERATURE: 98.6 F | HEART RATE: 90 BPM | HEIGHT: 67 IN | BODY MASS INDEX: 25.22 KG/M2

## 2023-04-16 PROBLEM — R50.9 FEVER, UNKNOWN ORIGIN: Status: ACTIVE | Noted: 2023-04-16

## 2023-04-16 LAB
ANION GAP SERPL CALC-SCNC: 8 MEQ/L (ref 3–15)
BASOPHILS # BLD: 0.02 K/UL (ref 0.01–0.1)
BASOPHILS NFR BLD: 0.3 %
BUN SERPL-MCNC: 9 MG/DL (ref 8–20)
CALCIUM SERPL-MCNC: 8.3 MG/DL (ref 8.9–10.3)
CHLORIDE SERPL-SCNC: 110 MEQ/L (ref 98–109)
CO2 SERPL-SCNC: 23 MEQ/L (ref 22–32)
CREAT SERPL-MCNC: 1 MG/DL (ref 0.8–1.3)
DIFFERENTIAL METHOD BLD: ABNORMAL
EOSINOPHIL # BLD: 0.18 K/UL (ref 0.04–0.54)
EOSINOPHIL NFR BLD: 2.6 %
ERYTHROCYTE [DISTWIDTH] IN BLOOD BY AUTOMATED COUNT: 14.1 % (ref 11.6–14.4)
GFR SERPL CREATININE-BSD FRML MDRD: >60 ML/MIN/1.73M*2
GLUCOSE SERPL-MCNC: 98 MG/DL (ref 70–99)
HCT VFR BLDCO AUTO: 41.4 % (ref 40.1–51)
HGB BLD-MCNC: 14 G/DL (ref 13.7–17.5)
IMM GRANULOCYTES # BLD AUTO: 0.02 K/UL (ref 0–0.08)
IMM GRANULOCYTES NFR BLD AUTO: 0.3 %
LYMPHOCYTES # BLD: 1.37 K/UL (ref 1.2–3.5)
LYMPHOCYTES NFR BLD: 19.7 %
MCH RBC QN AUTO: 32.3 PG (ref 28–33.2)
MCHC RBC AUTO-ENTMCNC: 33.8 G/DL (ref 32.2–36.5)
MCV RBC AUTO: 95.6 FL (ref 83–98)
MONOCYTES # BLD: 0.75 K/UL (ref 0.3–1)
MONOCYTES NFR BLD: 10.8 %
NEUTROPHILS # BLD: 4.6 K/UL (ref 1.7–7)
NEUTS SEG NFR BLD: 66.3 %
NRBC BLD-RTO: 0 %
PDW BLD AUTO: 9.6 FL (ref 9.4–12.4)
PLATELET # BLD AUTO: 144 K/UL (ref 150–350)
POTASSIUM SERPL-SCNC: 4 MEQ/L (ref 3.6–5.1)
RBC # BLD AUTO: 4.33 M/UL (ref 4.5–5.8)
SODIUM SERPL-SCNC: 141 MEQ/L (ref 136–144)
WBC # BLD AUTO: 6.94 K/UL (ref 3.8–10.5)

## 2023-04-16 PROCEDURE — G0378 HOSPITAL OBSERVATION PER HR: HCPCS

## 2023-04-16 PROCEDURE — 99239 HOSP IP/OBS DSCHRG MGMT >30: CPT | Performed by: HOSPITALIST

## 2023-04-16 PROCEDURE — 96376 TX/PRO/DX INJ SAME DRUG ADON: CPT | Performed by: HOSPITALIST

## 2023-04-16 PROCEDURE — 80048 BASIC METABOLIC PNL TOTAL CA: CPT | Performed by: HOSPITALIST

## 2023-04-16 PROCEDURE — 25800000 HC PHARMACY IV SOLUTIONS: Performed by: HOSPITALIST

## 2023-04-16 PROCEDURE — 63700000 HC SELF-ADMINISTRABLE DRUG: Performed by: HOSPITALIST

## 2023-04-16 PROCEDURE — 21400000 HC ROOM AND CARE CCU/INTERMEDIATE

## 2023-04-16 PROCEDURE — 63600000 HC DRUGS/DETAIL CODE: Performed by: HOSPITALIST

## 2023-04-16 PROCEDURE — 96366 THER/PROPH/DIAG IV INF ADDON: CPT | Performed by: HOSPITALIST

## 2023-04-16 PROCEDURE — 96361 HYDRATE IV INFUSION ADD-ON: CPT | Performed by: HOSPITALIST

## 2023-04-16 PROCEDURE — 85025 COMPLETE CBC W/AUTO DIFF WBC: CPT | Performed by: HOSPITALIST

## 2023-04-16 PROCEDURE — 36415 COLL VENOUS BLD VENIPUNCTURE: CPT | Performed by: HOSPITALIST

## 2023-04-16 RX ORDER — AMOXICILLIN AND CLAVULANATE POTASSIUM 875; 125 MG/1; MG/1
1 TABLET, FILM COATED ORAL 2 TIMES DAILY
Qty: 8 TABLET | Refills: 0 | Status: SHIPPED | OUTPATIENT
Start: 2023-04-16 | End: 2023-04-20

## 2023-04-16 RX ADMIN — AMPICILLIN AND SULBACTAM 3 G: 1; 2 INJECTION, POWDER, FOR SOLUTION INTRAMUSCULAR; INTRAVENOUS at 04:59

## 2023-04-16 RX ADMIN — AMPICILLIN AND SULBACTAM 3 G: 1; 2 INJECTION, POWDER, FOR SOLUTION INTRAMUSCULAR; INTRAVENOUS at 10:18

## 2023-04-16 RX ADMIN — OXYBUTYNIN CHLORIDE 5 MG: 5 TABLET, EXTENDED RELEASE ORAL at 10:18

## 2023-04-16 RX ADMIN — ACYCLOVIR 800 MG: 800 TABLET ORAL at 10:24

## 2023-04-16 NOTE — DISCHARGE SUMMARY
Hospital Medicine Service -  Inpatient Discharge Summary        BRIEF OVERVIEW   Admitting Provider: Joellen Christie DO  Attending Provider: Joellen Christie DO Attending phys phone: [phone]    PCP: James Vences [phone]    Admission Date: 4/14/2023  Discharge Date: 4/16/2023     DISCHARGE DIAGNOSES      Primary Discharge Diagnosis  Sepsis with acute renal failure (CMS/HCC)    Secondary Discharge Diagnoses  Active Hospital Problems    Diagnosis Date Noted   • Sepsis with acute renal failure (CMS/HCC) 04/14/2023     Priority: High   • Fever, unknown origin 04/16/2023   • Enteritis 04/14/2023   • BPH (benign prostatic hyperplasia) 04/14/2023   • GERD (gastroesophageal reflux disease) 04/14/2023   • History of herpes labialis 04/14/2023      Resolved Hospital Problems   No resolved problems to display.       Problem List on Day of Discharge  * Sepsis with acute renal failure (CMS/HCC)  Assessment & Plan  - Afebrile x24 hours. HR back to normal. No leukocytosis. Symptoms resolved.   - UA negative. CT A/P shows mild enteritis.   - Blood cx no growth. DEEP resolved.   - Tolerating regular diet without nausea.   - Switch to Augmentin to complete 5-day course.  F/U with PCP.      History of herpes labialis  Assessment & Plan  - Continue suppression with acyclovir.    GERD (gastroesophageal reflux disease)  Assessment & Plan  - Recent unrevealing EGD/colo  - Continue qhs famotidine    BPH (benign prostatic hyperplasia)  Assessment & Plan  - with hx prostate ca s/p radiation therapy in remission  - cont oxybutinin qam and tamsulosin qhs    Enteritis  Assessment & Plan  - See sepsis    SUMMARY OF HOSPITALIZATION      Presenting Problem/History of Present Illness  This is a 78 y.o. year-old male admitted on 4/14/2023 with Fever, unknown origin [R50.9]  Tachycardia [R00.0].    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 CT A/P showed mild enteritis. Labs revealed mild DEEP. He was treated with IV fluids and Unasyn with resolution of his symptoms. Renal function has returned to normal. He has remained afebrile for 24 hours. Blood cultures are negative. He is anxious to get home. Stable for discharge home today. I will continue Augmentin to complete a total 5-day course.     Exam on Day of Discharge  Physical Exam  Vitals and nursing note reviewed.   Constitutional:       General: He is not in acute distress.     Appearance: Normal appearance. He is well-developed.   HENT:      Head: Normocephalic and atraumatic.   Eyes:      Extraocular Movements: Extraocular movements intact.      Pupils: Pupils are equal, round, and reactive to light.   Cardiovascular:      Rate and Rhythm: Normal rate and regular rhythm.      Heart sounds: Normal heart sounds.   Pulmonary:      Effort: Pulmonary effort is normal. No respiratory distress.      Breath sounds: Normal breath sounds. No wheezing, rhonchi or rales.   Abdominal:      General: Bowel sounds are normal. There is no distension.      Palpations: Abdomen is soft.      Tenderness: There is no abdominal tenderness.   Musculoskeletal:         General: No swelling. Normal range of motion.      Cervical back: Normal range of motion and neck supple.   Skin:     General: Skin is warm and dry.   Neurological:      Mental Status: He is alert and oriented to person, place, and time.         Consults During Admission  None    DISCHARGE MEDICATIONS               Medication List      START taking these medications    amoxicillin-pot clavulanate 875-125 mg per tablet  Commonly known as: AUGMENTIN  Take 1 tablet by mouth 2 (two) times a day for 4 days.  Dose: 1 tablet        CONTINUE taking these medications    acyclovir 400 mg tablet  Commonly known as: ZOVIRAX  Take 800 mg by mouth daily.  Dose: 800 mg      famotidine 20 mg tablet  Commonly known as: PEPCID  Take 20 mg by mouth nightly.  Dose: 20 mg     oxybutynin XL 5 mg 24 hr tablet  Commonly known as: DITROPAN-XL  Take 5 mg by mouth daily.  Dose: 5 mg     tamsulosin 0.4 mg capsule  Commonly known as: FLOMAX  Take 0.4 mg by mouth nightly.  Dose: 0.4 mg             Instructions for after discharge     Call provider for:  difficulty breathing, headache or visual disturbances      Call provider for:  extreme fatigue      Call provider for:  persistent dizziness or light-headedness      Call provider for:  persistent nausea or vomiting      Call provider for:  rash      Call provider for:  severe uncontrolled pain      Call provider for:  temperature >100.4      Discharge diet      Diet Type / Texture: Regular    Follow-up with primary physician (PCP)      Within 1 week.    Post-Discharge Activity: Normal activity as tolerated.      Normal activity as tolerated.             PROCEDURES / LABS / IMAGING      Operative Procedures  None    Other Procedures  none    Pertinent Labs  Results from last 7 days   Lab Units 04/16/23  0217   WBC K/uL 6.94   HEMOGLOBIN g/dL 14.0   HEMATOCRIT % 41.4   PLATELETS K/uL 144*     Results from last 7 days   Lab Units 04/16/23  0217   SODIUM mEQ/L 141   POTASSIUM mEQ/L 4.0   CHLORIDE mEQ/L 110*   CO2 mEQ/L 23   BUN mg/dL 9   CREATININE mg/dL 1.0   GLUCOSE mg/dL 98   CALCIUM mg/dL 8.3*     SARS-CoV-2 (COVID-19) (no units)   Date/Time Value   04/14/2023 1811 Negative       Pertinent Imaging  US venous leg bilateral    Result Date: 4/14/2023  IMPRESSION: No sonographic evidence of acute deep venous thrombosis in the femoral-popliteal system bilaterally. COMMENT: Real-time duplex sonography of the deep veins of the lower extremities was performed with color and spectral Doppler including compression. The common femoral, superficial femoral, popliteal  and calf veins are normally compressible with spontaneous phasic wave forms in both lower  extremities.  No intraluminal filling defect is identified. There is normal color Doppler flow.     CT ANGIOGRAPHY CHEST PULMONARY EMBOLISM WITH IV CONTRAST    Result Date: 4/14/2023  IMPRESSION: There are no evidence of pulmonary embolic disease. There is no consolidation or pleural fluid. There are few mildly dilated fluid-filled proximal small bowel loops with gradual transition to normal caliber. Findings could represent a mild ileus, mild enteritis. No findings for bowel obstruction or other acute bowel or mesenteric process. Mild fullness of the renal collecting system and ureter is noted bilaterally, noting that the urinary bladder is moderately distended. This could be a reflection of the patient's state of hydration, or could represent mild vesicoureteric reflux. *Incidental bilateral adrenal body masses which measure up to 1.5 cm diameter. Follow-up imaging is recommended. Consider further characterization with adrenal protocol MRI, as is completed with and without intravenous gadolinium. See comment for additional incidental findings. Comparison study: None available. COMMENT: Axial CT images of the thorax are completed during the bolus intravenous infusion of 100 cc Omnipaque 350 according to the pulmonary embolism protocol. IV contrast enhanced imaging is completed through the abdomen and pelvis according to standard protocol. Images are reconstructed in sagittal and coronal planes. 3D MIP and/or volume rendered image reconstruction performed and reviewed. CT DOSE:  One or more dose reduction techniques (e.g. automated exposure control, adjustment of the mA and/or kV according to patient size, use of iterative reconstruction technique) utilized for this examination. Scans the lower neck show a approximately 4.8 x 3.4 cm lower pole left thyroid nodule which compresses and deviates the trachea to the right. No discernible adenopathy. Small right thyroid nodules are noted. Minor bilateral gynecomastia.  Dependent hypoventilatory changes noted. No consolidation or pleural fluid Clear central airways noted. No evidence of pulmonary embolic disease. Top normal cardiac size. Small pericardial effusion. No acute aortic syndrome, noting minor scattered atheromatous changes in the thoracic aorta. No pathologically enlarged nodes in the chest. Small hiatal hernia. The liver is normal in size, density and configuration. No intrahepatic mass or biliary dilatation. Normal caliber extrahepatic duct. The gallbladder is physiologically distended. No visible calculi or sludge. No gallbladder or inflammatory changes. Mild fatty atrophy the pancreas. The spleen is unremarkable, normal in size. There are bilateral adrenal masses. There is a 1.5 cm right adrenal body mass, and a 1.5 cm left adrenal body mass. These are incompletely characterized on today's study. Follow-up recommended. Symmetric renal enhancement and excretion. There is mild fullness of the renal collecting system and ureter bilaterally, which could be a function of the state of hydration. However, the urinary bladder is also moderately distended. Mild upper tract dilatation could also be the result of vesicoureteric reflux. No evidence of obstructive uropathy. No urinary tract calculi. No discernible renal mass. Enlarged prostate gland measuring approximately 5.27 the axial plane. The seminal vesicles are unremarkable. The stomach is only mildly distended. Few mildly dilated fluid-filled loops of proximal small bowel with gradual transition to normal caliber. No findings concerning for bowel obstruction. Mild small bowel distention could represent a mild ileus. Unremarkable terminal ileum. Normal appendix. The colon and rectum are unremarkable. No ascites. No free air. Early/mild atheromatous changes in the normal caliber abdominal aorta, iliac arteries. The IVC as asymmetrically visceral branches are unremarkable. Patent hepatic, main portal, splenic and major  mesenteric veins noted. Normal portacaval nodes and celiac axis nodes. There are no pathologically enlarged nodes. Few small sclerotic foci noted in the pelvic bones bilaterally. These have a nonaggressive appearance. Probable large bone island in the neck of the left scapular glenoid. There are multilevel spondylitic changes in lower cervical spine, mid to lower dorsal spine, and the lower lumbar spine. No suspicious osseous lesion.     CT ABDOMEN PELVIS WITH IV CONTRAST    Result Date: 4/14/2023  IMPRESSION: There are no evidence of pulmonary embolic disease. There is no consolidation or pleural fluid. There are few mildly dilated fluid-filled proximal small bowel loops with gradual transition to normal caliber. Findings could represent a mild ileus, mild enteritis. No findings for bowel obstruction or other acute bowel or mesenteric process. Mild fullness of the renal collecting system and ureter is noted bilaterally, noting that the urinary bladder is moderately distended. This could be a reflection of the patient's state of hydration, or could represent mild vesicoureteric reflux. *Incidental bilateral adrenal body masses which measure up to 1.5 cm diameter. Follow-up imaging is recommended. Consider further characterization with adrenal protocol MRI, as is completed with and without intravenous gadolinium. See comment for additional incidental findings. Comparison study: None available. COMMENT: Axial CT images of the thorax are completed during the bolus intravenous infusion of 100 cc Omnipaque 350 according to the pulmonary embolism protocol. IV contrast enhanced imaging is completed through the abdomen and pelvis according to standard protocol. Images are reconstructed in sagittal and coronal planes. 3D MIP and/or volume rendered image reconstruction performed and reviewed. CT DOSE:  One or more dose reduction techniques (e.g. automated exposure control, adjustment of the mA and/or kV according to patient  size, use of iterative reconstruction technique) utilized for this examination. Scans the lower neck show a approximately 4.8 x 3.4 cm lower pole left thyroid nodule which compresses and deviates the trachea to the right. No discernible adenopathy. Small right thyroid nodules are noted. Minor bilateral gynecomastia. Dependent hypoventilatory changes noted. No consolidation or pleural fluid Clear central airways noted. No evidence of pulmonary embolic disease. Top normal cardiac size. Small pericardial effusion. No acute aortic syndrome, noting minor scattered atheromatous changes in the thoracic aorta. No pathologically enlarged nodes in the chest. Small hiatal hernia. The liver is normal in size, density and configuration. No intrahepatic mass or biliary dilatation. Normal caliber extrahepatic duct. The gallbladder is physiologically distended. No visible calculi or sludge. No gallbladder or inflammatory changes. Mild fatty atrophy the pancreas. The spleen is unremarkable, normal in size. There are bilateral adrenal masses. There is a 1.5 cm right adrenal body mass, and a 1.5 cm left adrenal body mass. These are incompletely characterized on today's study. Follow-up recommended. Symmetric renal enhancement and excretion. There is mild fullness of the renal collecting system and ureter bilaterally, which could be a function of the state of hydration. However, the urinary bladder is also moderately distended. Mild upper tract dilatation could also be the result of vesicoureteric reflux. No evidence of obstructive uropathy. No urinary tract calculi. No discernible renal mass. Enlarged prostate gland measuring approximately 5.27 the axial plane. The seminal vesicles are unremarkable. The stomach is only mildly distended. Few mildly dilated fluid-filled loops of proximal small bowel with gradual transition to normal caliber. No findings concerning for bowel obstruction. Mild small bowel distention could represent a mild  ileus. Unremarkable terminal ileum. Normal appendix. The colon and rectum are unremarkable. No ascites. No free air. Early/mild atheromatous changes in the normal caliber abdominal aorta, iliac arteries. The IVC as asymmetrically visceral branches are unremarkable. Patent hepatic, main portal, splenic and major mesenteric veins noted. Normal portacaval nodes and celiac axis nodes. There are no pathologically enlarged nodes. Few small sclerotic foci noted in the pelvic bones bilaterally. These have a nonaggressive appearance. Probable large bone island in the neck of the left scapular glenoid. There are multilevel spondylitic changes in lower cervical spine, mid to lower dorsal spine, and the lower lumbar spine. No suspicious osseous lesion.       OUTPATIENT  FOLLOW-UP / REFERRALS / PENDING TESTS        Outpatient Follow-Up Appointments  Encounter Information    This patient does not currently have any appointments scheduled.     1) PCP in 1 week.     Referrals  No orders of the defined types were placed in this encounter.      Test Results Pending at Discharge  Unresulted Labs (From admission, onward)                                                       None    Important Issues to Address in Follow-Up  -Complete antibiotic course.  -Follow-up incidental adrenal masses    DISCHARGE DISPOSITION AND DESTINATION      Disposition: Home                          Code Status At Discharge: Full Code    Physician Order for Life-Sustaining Treatment Document Status      No documents found

## 2023-04-16 NOTE — UM PHYSICIAN REVIEW NOTE
Inpatient status is appropriate for this 79yo male presenting with fever, nausea and shaking chills and thought to have enteritis.  Is maintained on IV Unasyn.  Has required >2MN stay.    Sarah Biswas MD

## 2023-04-16 NOTE — PLAN OF CARE
Problem: Adult Inpatient Plan of Care  Goal: Plan of Care Review  Outcome: Progressing  Flowsheets (Taken 4/16/2023 0402)  Progress: improving  Plan of Care Reviewed With: patient  Outcome Summary: Pt continues to receive IV antibiotics. LR discontinued compared to previous shift  Goal: Patient-Specific Goal (Individualized)  Outcome: Progressing  Goal: Absence of Hospital-Acquired Illness or Injury  Outcome: Progressing  Goal: Optimal Comfort and Wellbeing  Outcome: Progressing  Goal: Readiness for Transition of Care  Outcome: Progressing

## 2023-04-18 NOTE — ED ATTESTATION NOTE
I have personally seen and examined Saeid Owens.  I was involved in the care and medical decision making for this patient.    I reviewed and agree with physician assistant / nurse practitioner’s assessment and plan of care; any exceptions are documented below.      My focused history, examination, assessment and plan of care of Saeid Owens is as follows:    Brief History:  HPI  78-year-old male past medical history of prostate cancer, radiation who presents with nausea weakness and shakiness today.  He reports it started suddenly.  Noted some cramping in his legs.  Denies any abdominal pain, urinary symptoms or shortness of breath.      Focused Physical Exam:  Physical Exam  AAO x 3  CV: S1S2 tachy  Lungs: CTA BL      Assessment / Plan / MDM:  MDM  79 yo male with tachycardia, chills - found to be febrile in ED  Labs  EKG  Fluids  UA  CXR             Karoline Trent MD  04/18/23 2220       Karoline Trent MD  04/18/23 2221

## 2023-04-20 LAB
BACTERIA BLD CULT: NORMAL
BACTERIA BLD CULT: NORMAL

## 2023-06-06 ENCOUNTER — APPOINTMENT (EMERGENCY)
Dept: RADIOLOGY | Facility: HOSPITAL | Age: 78
End: 2023-06-06
Payer: MEDICARE

## 2023-06-06 ENCOUNTER — HOSPITAL ENCOUNTER (EMERGENCY)
Facility: HOSPITAL | Age: 78
Discharge: HOME | End: 2023-06-06
Attending: EMERGENCY MEDICINE
Payer: MEDICARE

## 2023-06-06 VITALS
DIASTOLIC BLOOD PRESSURE: 78 MMHG | OXYGEN SATURATION: 97 % | HEART RATE: 85 BPM | SYSTOLIC BLOOD PRESSURE: 124 MMHG | TEMPERATURE: 97.5 F | RESPIRATION RATE: 18 BRPM

## 2023-06-06 DIAGNOSIS — M79.605 LEFT LEG PAIN: Primary | ICD-10-CM

## 2023-06-06 PROCEDURE — 99281 EMR DPT VST MAYX REQ PHY/QHP: CPT | Mod: 25

## 2023-06-06 PROCEDURE — 99284 EMERGENCY DEPT VISIT MOD MDM: CPT | Mod: 25

## 2023-06-06 PROCEDURE — 93971 EXTREMITY STUDY: CPT | Mod: LT

## 2023-06-06 ASSESSMENT — ENCOUNTER SYMPTOMS
DIZZINESS: 0
CHEST TIGHTNESS: 0
VOMITING: 0
SHORTNESS OF BREATH: 0
WOUND: 0
NAUSEA: 0
FEVER: 0
COUGH: 0
LIGHT-HEADEDNESS: 0
CHILLS: 0
ABDOMINAL PAIN: 0

## 2023-06-07 NOTE — ED ATTESTATION NOTE
I saw and evaluated the patient.  I provided a substantive portion of the care for this patient. I personally performed all aspects of the medical decision making for this encounter.  I have reviewed and verified this documentation and it accurately reflects our care.    I have personally seen and examined the patient.  I reviewed and agree with physician assistant / nurse practitioner´s assessment and plan of care    My examination, assessment, and plan of care of  is as follows:    Patient presents with some pain behind the left knee.  This been going on since the weekend he was hiking in the [facility] thought he tweaked the left knee which he had a knee replacement in the past.  He was concerned he might have a DVT no redness no shortness of breath no fevers no chills    Exam  Patient awake alert nontoxic-appearing  Left knee scar with replacement no swelling calf minimal tenderness behind the left knee in the popliteal fossa normal ankle exam no redness no warmth no erythema    Plan  Ultrasound was done no DVT seen patient was reassured will discharge to follow-up with primary doctor to get repeat ultrasound in 1 week.     James Rees MD  06/06/23 2516

## 2023-06-07 NOTE — DISCHARGE INSTRUCTIONS
Please call and follow-up with your primary care doctor in the next 5 to 7 days.  As we discussed if you continue to have pain over the next 5 to 7 days it is recommended you get a repeat ultrasound of this leg.  Return here right away if your symptoms were to change get worse or any other concerns.

## 2023-06-07 NOTE — ED PROVIDER NOTES
Emergency Medicine Note  HPI   HISTORY OF PRESENT ILLNESS       History provided by:  Patient  Lower Extremity Issue  Associated symptoms: no fever       Patient is a 78-year-old male with past medical history of prostate cancer BPH GERD that is present emergency room today due to left leg pain.  Patient reports he was hiking in the woods [place] about a week ago thinks maybe tweaked his knee just a little bit but really had no major symptoms reportedly tonight had increased pain mainly behind his left knee and to the top of his calf.  No reported chest pain shortness of breath or any other symptoms patient reports he feels like maybe the left leg feels little tight and may be a little bit swollen.  Patient was concerned about a possible DVT so presented the emergency room for evaluation.  Patient has not had any recent surgeries no long trips or travel he thinks his father had a stroke in his 90s but other than that no major clotting history.        Patient History   PAST HISTORY     Reviewed from Nursing Triage:       History reviewed. No pertinent past medical history.    History reviewed. No pertinent surgical history.    History reviewed. No pertinent family history.    Social History     Tobacco Use   • Smoking status: Never   • Smokeless tobacco: Never         Review of Systems   REVIEW OF SYSTEMS     Review of Systems   Constitutional: Negative for chills and fever.   Respiratory: Negative for cough, chest tightness and shortness of breath.    Cardiovascular: Negative for chest pain.   Gastrointestinal: Negative for abdominal pain, nausea and vomiting.   Skin: Negative for rash and wound.   Neurological: Negative for dizziness, syncope and light-headedness.         VITALS     ED Vitals    Date/Time Temp Pulse Resp BP SpO2 [facility]   06/06/23 2236 36.4 °C (97.5 °F) 85 18 124/78 97 %    06/06/23 2228 -- -- -- 138/73 -- GC   06/06/23 2227 36.7 °C (98 °F) 87 18 -- 97 % GC        Pulse Ox %: 97 % (06/06/23  2330)  Pulse Ox Interpretation: Normal (06/06/23 2330)           Physical Exam   PHYSICAL EXAM     Physical Exam  Vitals and nursing note reviewed.   Constitutional:       Appearance: Normal appearance.   Cardiovascular:      Rate and Rhythm: Normal rate and regular rhythm.      Pulses: Normal pulses.      Heart sounds: Normal heart sounds.   Pulmonary:      Effort: Pulmonary effort is normal.      Breath sounds: Normal breath sounds. No wheezing, rhonchi or rales.   Chest:      Chest wall: No tenderness.   Musculoskeletal:         General: Tenderness present. No swelling or signs of injury. Normal range of motion.      Comments: Patient with some very mild tenderness to his posterior lower left knee and top of his calf no skin changes DP/PT pulses equal and normal skin temperature and color equal and normal.   Skin:     General: Skin is warm and dry.      Capillary Refill: Capillary refill takes less than 2 seconds.   Neurological:      General: No focal deficit present.      Mental Status: He is alert.           PROCEDURES     Procedures     DATA     Results     None          Imaging Results          US venous leg, LL extremity (Final result)  Result time 06/07/23 07:59:56    Final result                 Impression:    IMPRESSION:    No femoropopliteal thrombosis.               Narrative:    CLINICAL INDICATION: Swelling    COMPARISON: None    COMMENT:    Duplex/color Doppler evaluation of the deep venous system of the  left lower extremity was performed.      LEFT:    COMMON FEMORAL VEIN: Normal compression  DEEP FEMORAL VEIN: Patent by color Doppler  FEMORAL VEIN: Normal compression  POPLITEAL VEIN: Normal compression      Visualized calf veins compress normally.                                No orders to display       Scoring tools                                  ED Course & MDM   MDM / ED COURSE / CLINICAL IMPRESSION / DISPO     MDM    ED Course as of 06/07/23 2327   Tue Jun 06, 2023 2327 Patient  Name: Saeid Owens  Exam(s): LLE doppler  MR#: [id number]    History: LLE pain behind knee/swelling    Preliminary Impression:    No evidence of DVT.      Interpreted by: Kuldeep Perkins MD, Jun 06, 2023 11:24 PM [TK]      ED Course User Index  [TK] Tha Acosta PA C     Clinical Impression      Left leg pain     _________________     ED Disposition   Discharge                   Tha Acosta PA C  06/07/23 7686

## 2024-03-12 ENCOUNTER — OFFICE VISIT (OUTPATIENT)
Dept: ORTHOPEDICS | Facility: CLINIC | Age: 79
End: 2024-03-12
Payer: MEDICARE

## 2024-03-12 VITALS — BODY MASS INDEX: 23.86 KG/M2 | OXYGEN SATURATION: 98 % | RESPIRATION RATE: 14 BRPM | HEIGHT: 67 IN | WEIGHT: 152 LBS

## 2024-03-12 DIAGNOSIS — M17.11 ARTHRITIS OF KNEE, RIGHT: Primary | ICD-10-CM

## 2024-03-12 PROCEDURE — 99213 OFFICE O/P EST LOW 20 MIN: CPT | Mod: 25 | Performed by: ORTHOPAEDIC SURGERY

## 2024-03-12 PROCEDURE — 20610 DRAIN/INJ JOINT/BURSA W/O US: CPT | Mod: RT | Performed by: ORTHOPAEDIC SURGERY

## 2024-03-12 NOTE — PROGRESS NOTES
[facility]     Patient ID: Saeid Owens is a 78 y.o. male.  1945    Chief Complaint: Saeid returns for reevaluation of his right knee arthritis    HPI: He is a gentleman who has a history of left knee arthroplasty back in 2016 and the right knee was never as bad and has been treated conservatively for arthritis.  His x-rays have shown degenerative arthritis in the right knee for which we have treated him with artificial lubricant injections.  He received a Monovisc injection back in 2019 and tolerated that well.  Review of systems negative except as stated in above HPI.        There were no vitals filed for this visit.  There is no height or weight on file to calculate BMI.    Physical Exam: The left knee has a well-healed incision and just about full extension and flexion to greater than 120    The right knee has some mild synovitis of subtle flexion contracture and flexion to 120 and pain both on the medial and lateral joint lines and patellofemoral crepitus.  He has no instability.  Imaging:    Assessment/Plan: Right knee arthritis.  Treatment options were discussed in detail including living with it versus additional therapy, anti-inflammatory medication, and additional injections inclusive of both steroid and artificial lubricant injections which she has had in the past.  He was advised of the risks and benefits of Monovisc indicated for osteoarthritis.      MONOVISC Knee Injection    The patient was advised of the risks and benefits of MONOVISC, which is indicated for the treatment of pain and osteoarthritis of the knee.  They understand that the most commonly reported side effects are temporary pain, swelling and/or fluid accumulation in the knee.  They were advised that if the fluid accumulation is large or painful that they should call this office for recommendation.  They also understand that there is the potential risk of allergic reaction as well.  They denied that they  are allergic to products from birds, such as feathers, eggs or poultry prior to receiving this injection.  Standard injection of hylan G-F20 was performed under sterile conditions.  I advised the patient that any injection has the potential of causing side effects locally from the needle stick and skin irritation as well as the systemic effects from medication that is utilized. The appropriate timeout was taken. We identified and marked the appropriate anatomic landmarks to guide needle placement. The area was prepped in the usual sterile fashion and the overlying skin cleaned using isopropyl alcohol   Local anesthesia achieved using Ethyl Chloride spray (Cooling spray). I injected 6cc of  MONOVISC™ High Molecular Weight Hyaluronan into the knee joint and advised the patient that they should ice their knee three times a day for approximately 10 minutes each for about two to three days. The patient tolerated the injection well without complications.  If any untoward effects are noted, they are advised to call my office or any treating physician available.    Monovisc injections are used to treat pain and stiffness in your knee. Monovisc is a high molecular weight hyaluronan which is a naturally occurring lubricant found in healthy knee joints. The knee may feel stiff or full for 2-3 hours after the injection. The patient was advised to rest and increase activity as tolerated once the stiffness starts to resolve. • The patient will Ice the knee and take an anti-inflammatory if the stiffness persists for more than 4 hours after the injection. • The patient will avoid soaking in water 48 hours after the injection. Showering is safe, but the patient will avoid soaking the knee in a bathtub. The patient will avoid going swimming in a pool, lake or hot tub for 48 hours as it may increase their infection risk. Things the patient should be aware of: • 4-6 weeks from the injection is when the patient will feel the full effect  of the medication. • The effects of the medication last on average 4-6 months. • The patient will can repeat Monovisc injections every 6 months as needed.      Sergio Fernández MD

## 2024-03-12 NOTE — PROCEDURES
LG Arthrocentesis: R knee    Date/Time: 3/12/2024 11:01 AM    Performed by: Sergio Fernández MD  Authorized by: Sergio Fernández MD    Indications:  Pain  Needle Size:  18 G  Approach:  Anterolateral  Location:  Knee  Site:  R knee  Medications:  88 mg hyaluronate sodium, stabilized 88 mg/4 mL  Consent Given by:  Patient  Timeout: timeout called immediately prior to procedure    Prep: patient was prepped and draped in usual sterile fashion

## 2024-03-25 ENCOUNTER — HOSPITAL ENCOUNTER (EMERGENCY)
Facility: HOSPITAL | Age: 79
Discharge: LEFT BEFORE DISCHARGE | End: 2024-03-25
Payer: MEDICARE

## 2024-03-26 ENCOUNTER — TELEPHONE (OUTPATIENT)
Dept: EMERGENCY MEDICINE | Facility: HOSPITAL | Age: 79
End: 2024-03-26
Payer: MEDICARE

## 2024-03-26 NOTE — TELEPHONE ENCOUNTER
Patient presented to the ED on 3/25/2024 c/o fever. Patient Left Without BeingSeen after Triage. Patient states that he is ok. [phone]

## 2024-09-06 ENCOUNTER — OFFICE VISIT (OUTPATIENT)
Dept: ORTHOPEDICS | Facility: CLINIC | Age: 79
End: 2024-09-06
Payer: MEDICARE

## 2024-09-06 DIAGNOSIS — M75.121 COMPLETE TEAR OF RIGHT ROTATOR CUFF, UNSPECIFIED WHETHER TRAUMATIC: Primary | ICD-10-CM

## 2024-09-06 PROCEDURE — 99214 OFFICE O/P EST MOD 30 MIN: CPT | Mod: 25 | Performed by: ORTHOPAEDIC SURGERY

## 2024-09-06 PROCEDURE — 20610 DRAIN/INJ JOINT/BURSA W/O US: CPT | Mod: RT | Performed by: ORTHOPAEDIC SURGERY

## 2024-09-06 RX ORDER — METHYLPREDNISOLONE ACETATE 80 MG/ML
80 INJECTION, SUSPENSION INTRA-ARTICULAR; INTRALESIONAL; INTRAMUSCULAR; SOFT TISSUE ONCE
Status: COMPLETED | OUTPATIENT
Start: 2024-09-06 | End: 2024-09-06

## 2024-09-06 RX ORDER — LIDOCAINE HYDROCHLORIDE 10 MG/ML
10 INJECTION, SOLUTION INFILTRATION; PERINEURAL ONCE
Status: COMPLETED | OUTPATIENT
Start: 2024-09-06 | End: 2024-09-06

## 2024-09-06 RX ADMIN — METHYLPREDNISOLONE ACETATE 80 MG: 80 INJECTION, SUSPENSION INTRA-ARTICULAR; INTRALESIONAL; INTRAMUSCULAR; SOFT TISSUE at 15:13

## 2024-09-06 RX ADMIN — LIDOCAINE HYDROCHLORIDE 10 ML: 10 INJECTION, SOLUTION INFILTRATION; PERINEURAL at 15:13

## 2024-09-06 NOTE — PROGRESS NOTES
[facility]     Patient ID: Saeid Owens is a 79 y.o. male.  1945    Chief Complaint: Right shoulder pain    HPI: Saeid is a known history of right shoulder full-thickness rotator cuff tear that he chose not to have surgery for.  The MRI was over a year ago.  However he has had progressive symptomatology and he is concerned about that rotator cuff.  No major trauma reported.  No fever chills sweats constitutional symptoms.  The pain is anterior lateral when it occurs.  He actually called up because he wanted to have an injection because it was painful.  He has no issues at range of motion is not currently in physical therapy.  He is a golfer    Review of Systems:  Review of systems negative except as stated in above HPI.        There were no vitals filed for this visit.  There is no height or weight on file to calculate BMI.    Physical Exam: Bilateral shoulders examined and compared.  No atrophy noted.  Full range of motion with positive impingement in the right and weakness in rotator cuff forward elevation and abduction.    Imaging:    Assessment/Plan:  Impingement syndrome and full-thickness rotator cuff tear right shoulder.  Concern is that the tear may have gotten bigger.  Concern is that it may continue to get bigger given that he is get progressive symptomatology.  Recommendation for follow-up MRI.  I injected him today in the right shoulder    Subacromial Shoulder Injection:    I advised the patient that any injection has the potential of causing side effects locally from the needle stick and skin irritation as well as the systemic effects from medication that is utilized. The appropriate timeout was taken. We identified and marked the appropriate anatomic landmarks to guide needle placement. The area was prepped in the usual sterile fashion and the overlying skin cleaned using isopropyl alcohol   Local anesthesia achieved using Ethyl Chloride spray (Cooling spray). I  injected 80 mg of Depo-Medrol and 4 cc of 1 percent lidocaine under sterile conditions into the subacromial space and advised the patient that they should ice their shoulder three times a day for approximately 10 minutes each for about two to three days. The patient tolerated the injection well without complications.  If any untoward effects are noted, they are advised to call my office or any treating physician available.  He is going to get back to me after the MRI is completed.  He can call me if needed    Sergio Fernández MD

## 2024-09-14 ENCOUNTER — APPOINTMENT (OUTPATIENT)
Dept: RADIOLOGY | Age: 79
End: 2024-09-14
Attending: ORTHOPAEDIC SURGERY
Payer: MEDICARE

## 2024-09-14 DIAGNOSIS — M75.121 COMPLETE TEAR OF RIGHT ROTATOR CUFF, UNSPECIFIED WHETHER TRAUMATIC: ICD-10-CM

## 2024-09-14 PROCEDURE — 73221 MRI JOINT UPR EXTREM W/O DYE: CPT | Mod: RT

## 2025-02-09 ENCOUNTER — APPOINTMENT (EMERGENCY)
Dept: RADIOLOGY | Facility: HOSPITAL | Age: 80
End: 2025-02-09
Payer: MEDICARE

## 2025-02-09 ENCOUNTER — HOSPITAL ENCOUNTER (EMERGENCY)
Facility: HOSPITAL | Age: 80
Discharge: HOME | End: 2025-02-09
Attending: STUDENT IN AN ORGANIZED HEALTH CARE EDUCATION/TRAINING PROGRAM | Admitting: STUDENT IN AN ORGANIZED HEALTH CARE EDUCATION/TRAINING PROGRAM
Payer: MEDICARE

## 2025-02-09 VITALS
DIASTOLIC BLOOD PRESSURE: 75 MMHG | SYSTOLIC BLOOD PRESSURE: 109 MMHG | OXYGEN SATURATION: 97 % | HEART RATE: 99 BPM | TEMPERATURE: 99 F | RESPIRATION RATE: 16 BRPM

## 2025-02-09 DIAGNOSIS — B34.9 VIRAL ILLNESS: Primary | ICD-10-CM

## 2025-02-09 DIAGNOSIS — D35.01 BILATERAL ADRENAL ADENOMAS: ICD-10-CM

## 2025-02-09 DIAGNOSIS — R50.9 FEVER, UNSPECIFIED FEVER CAUSE: ICD-10-CM

## 2025-02-09 DIAGNOSIS — R11.0 NAUSEA: ICD-10-CM

## 2025-02-09 DIAGNOSIS — D35.02 BILATERAL ADRENAL ADENOMAS: ICD-10-CM

## 2025-02-09 LAB
ALBUMIN SERPL-MCNC: 4.6 G/DL (ref 3.5–5.7)
ALP SERPL-CCNC: 39 IU/L (ref 34–125)
ALT SERPL-CCNC: 39 IU/L (ref 7–52)
ANION GAP SERPL CALC-SCNC: 7 MEQ/L (ref 3–15)
AST SERPL-CCNC: 23 IU/L (ref 13–39)
ATRIAL RATE: 118
BASOPHILS # BLD: 0.03 K/UL (ref 0.01–0.1)
BASOPHILS NFR BLD: 0.2 %
BILIRUB SERPL-MCNC: 0.8 MG/DL (ref 0.3–1.2)
BILIRUB UR QL STRIP.AUTO: NEGATIVE MG/DL
BUN SERPL-MCNC: 15 MG/DL (ref 7–25)
CALCIUM SERPL-MCNC: 9.7 MG/DL (ref 8.6–10.3)
CHLORIDE SERPL-SCNC: 103 MEQ/L (ref 98–107)
CLARITY UR REFRACT.AUTO: CLEAR
CO2 SERPL-SCNC: 27 MEQ/L (ref 21–31)
COLOR UR AUTO: YELLOW
CREAT SERPL-MCNC: 1.1 MG/DL (ref 0.7–1.3)
DIFFERENTIAL METHOD BLD: ABNORMAL
EGFRCR SERPLBLD CKD-EPI 2021: >60 ML/MIN/1.73M*2
EOSINOPHIL # BLD: 0.06 K/UL (ref 0.04–0.54)
EOSINOPHIL NFR BLD: 0.4 %
ERYTHROCYTE [DISTWIDTH] IN BLOOD BY AUTOMATED COUNT: 13.2 % (ref 11.6–14.4)
FLUAV RNA SPEC QL NAA+PROBE: NEGATIVE
FLUBV RNA SPEC QL NAA+PROBE: NEGATIVE
GLUCOSE SERPL-MCNC: 123 MG/DL (ref 70–99)
GLUCOSE UR STRIP.AUTO-MCNC: NEGATIVE MG/DL
HCT VFR BLD AUTO: 52.2 % (ref 40.1–51)
HGB BLD-MCNC: 17 G/DL (ref 13.7–17.5)
HGB UR QL STRIP.AUTO: NEGATIVE
IMM GRANULOCYTES # BLD AUTO: 0.05 K/UL (ref 0–0.08)
IMM GRANULOCYTES NFR BLD AUTO: 0.4 %
KETONES UR STRIP.AUTO-MCNC: NEGATIVE MG/DL
LACTATE SERPL-SCNC: 1.6 MMOL/L (ref 0.4–2)
LEUKOCYTE ESTERASE UR QL STRIP.AUTO: NEGATIVE
LYMPHOCYTES # BLD: 0.49 K/UL (ref 1.2–3.5)
LYMPHOCYTES NFR BLD: 3.5 %
MCH RBC QN AUTO: 31.5 PG (ref 28–33.2)
MCHC RBC AUTO-ENTMCNC: 32.6 G/DL (ref 32.2–36.5)
MCV RBC AUTO: 96.7 FL (ref 83–98)
MONOCYTES # BLD: 0.59 K/UL (ref 0.3–1)
MONOCYTES NFR BLD: 4.2 %
NEUTROPHILS # BLD: 12.96 K/UL (ref 1.7–7)
NEUTS SEG NFR BLD: 91.3 %
NITRITE UR QL STRIP.AUTO: NEGATIVE
NRBC BLD-RTO: 0 %
P AXIS: 55
PH UR STRIP.AUTO: 7 [PH]
PLATELET # BLD AUTO: 244 K/UL (ref 150–350)
PMV BLD AUTO: 9 FL (ref 9.4–12.4)
POTASSIUM SERPL-SCNC: 4.3 MEQ/L (ref 3.5–5.1)
PR INTERVAL: 182
PROT SERPL-MCNC: 7.5 G/DL (ref 6–8.2)
PROT UR QL STRIP.AUTO: NEGATIVE
QRS DURATION: 96
QT INTERVAL: 306
QTC CALCULATION(BAZETT): 428
R AXIS: 233
RBC # BLD AUTO: 5.4 M/UL (ref 4.5–5.8)
RSV RNA SPEC QL NAA+PROBE: NEGATIVE
SARS-COV-2 RNA RESP QL NAA+PROBE: NEGATIVE
SODIUM SERPL-SCNC: 137 MEQ/L (ref 136–145)
SP GR UR REFRACT.AUTO: >1.035
T WAVE AXIS: 41
TROPONIN I SERPL HS-MCNC: 3.1 PG/ML
UROBILINOGEN UR STRIP-ACNC: 0.2 EU/DL
VENTRICULAR RATE: 118
WBC # BLD AUTO: 14.18 K/UL (ref 3.8–10.5)

## 2025-02-09 PROCEDURE — 93005 ELECTROCARDIOGRAM TRACING: CPT

## 2025-02-09 PROCEDURE — 63700000 HC SELF-ADMINISTRABLE DRUG

## 2025-02-09 PROCEDURE — 85025 COMPLETE CBC W/AUTO DIFF WBC: CPT | Performed by: STUDENT IN AN ORGANIZED HEALTH CARE EDUCATION/TRAINING PROGRAM

## 2025-02-09 PROCEDURE — 96374 THER/PROPH/DIAG INJ IV PUSH: CPT

## 2025-02-09 PROCEDURE — 96361 HYDRATE IV INFUSION ADD-ON: CPT

## 2025-02-09 PROCEDURE — 63600105 HC IODINE BASED CONTRAST: Mod: JZ

## 2025-02-09 PROCEDURE — 87040 BLOOD CULTURE FOR BACTERIA: CPT | Mod: 91

## 2025-02-09 PROCEDURE — 63600000 HC DRUGS/DETAIL CODE: Mod: JZ

## 2025-02-09 PROCEDURE — 36415 COLL VENOUS BLD VENIPUNCTURE: CPT

## 2025-02-09 PROCEDURE — 3E0337Z INTRODUCTION OF ELECTROLYTIC AND WATER BALANCE SUBSTANCE INTO PERIPHERAL VEIN, PERCUTANEOUS APPROACH: ICD-10-PCS | Performed by: STUDENT IN AN ORGANIZED HEALTH CARE EDUCATION/TRAINING PROGRAM

## 2025-02-09 PROCEDURE — 83605 ASSAY OF LACTIC ACID: CPT

## 2025-02-09 PROCEDURE — 85025 COMPLETE CBC W/AUTO DIFF WBC: CPT

## 2025-02-09 PROCEDURE — 71045 X-RAY EXAM CHEST 1 VIEW: CPT

## 2025-02-09 PROCEDURE — 84484 ASSAY OF TROPONIN QUANT: CPT

## 2025-02-09 PROCEDURE — 80053 COMPREHEN METABOLIC PANEL: CPT | Performed by: STUDENT IN AN ORGANIZED HEALTH CARE EDUCATION/TRAINING PROGRAM

## 2025-02-09 PROCEDURE — 87637 SARSCOV2&INF A&B&RSV AMP PRB: CPT

## 2025-02-09 PROCEDURE — 3E033GC INTRODUCTION OF OTHER THERAPEUTIC SUBSTANCE INTO PERIPHERAL VEIN, PERCUTANEOUS APPROACH: ICD-10-PCS | Performed by: STUDENT IN AN ORGANIZED HEALTH CARE EDUCATION/TRAINING PROGRAM

## 2025-02-09 PROCEDURE — 99284 EMERGENCY DEPT VISIT MOD MDM: CPT | Mod: 25

## 2025-02-09 PROCEDURE — 81003 URINALYSIS AUTO W/O SCOPE: CPT | Performed by: STUDENT IN AN ORGANIZED HEALTH CARE EDUCATION/TRAINING PROGRAM

## 2025-02-09 PROCEDURE — 25800000 HC PHARMACY IV SOLUTIONS

## 2025-02-09 PROCEDURE — 74177 CT ABD & PELVIS W/CONTRAST: CPT

## 2025-02-09 PROCEDURE — 87637 SARSCOV2&INF A&B&RSV AMP PRB: CPT | Performed by: STUDENT IN AN ORGANIZED HEALTH CARE EDUCATION/TRAINING PROGRAM

## 2025-02-09 PROCEDURE — 80053 COMPREHEN METABOLIC PANEL: CPT

## 2025-02-09 RX ORDER — ONDANSETRON HYDROCHLORIDE 2 MG/ML
4 INJECTION, SOLUTION INTRAVENOUS ONCE
Status: COMPLETED | OUTPATIENT
Start: 2025-02-09 | End: 2025-02-09

## 2025-02-09 RX ORDER — IOPAMIDOL 755 MG/ML
100 INJECTION, SOLUTION INTRAVASCULAR
Status: COMPLETED | OUTPATIENT
Start: 2025-02-09 | End: 2025-02-09

## 2025-02-09 RX ORDER — ACETAMINOPHEN 325 MG/1
975 TABLET ORAL ONCE
Status: COMPLETED | OUTPATIENT
Start: 2025-02-09 | End: 2025-02-09

## 2025-02-09 RX ORDER — IBUPROFEN 600 MG/1
600 TABLET ORAL ONCE
Status: COMPLETED | OUTPATIENT
Start: 2025-02-09 | End: 2025-02-09

## 2025-02-09 RX ADMIN — SODIUM CHLORIDE 1000 ML: 9 INJECTION, SOLUTION INTRAVENOUS at 18:02

## 2025-02-09 RX ADMIN — IBUPROFEN 600 MG: 600 TABLET, FILM COATED ORAL at 18:01

## 2025-02-09 RX ADMIN — IOPAMIDOL 100 ML: 755 INJECTION, SOLUTION INTRAVENOUS at 15:31

## 2025-02-09 RX ADMIN — SODIUM CHLORIDE 1000 ML: 9 INJECTION, SOLUTION INTRAVENOUS at 14:49

## 2025-02-09 RX ADMIN — ONDANSETRON 4 MG: 2 INJECTION INTRAMUSCULAR; INTRAVENOUS at 14:49

## 2025-02-09 RX ADMIN — ACETAMINOPHEN 975 MG: 325 TABLET ORAL at 16:08

## 2025-02-09 ASSESSMENT — ENCOUNTER SYMPTOMS
ARTHRALGIAS: 1
DYSURIA: 0
NUMBNESS: 0
DIARRHEA: 0
MYALGIAS: 1
VOMITING: 0
WEAKNESS: 1
NAUSEA: 1
ANAL BLEEDING: 1
FLANK PAIN: 0
RHINORRHEA: 1
HEADACHES: 0
SHORTNESS OF BREATH: 0
HEMATURIA: 0
FREQUENCY: 0
FATIGUE: 1
FEVER: 1
CHILLS: 1

## 2025-02-09 NOTE — ED PROVIDER NOTES
Emergency Medicine Note  HPI   HISTORY OF PRESENT ILLNESS     Patient is a 79-year-old male with a past medical history of prostate cancer and HLD presenting to the emergency department for evaluation of generalized weakness.  Patient states that last evening, he felt nauseous.  He did not vomit.  He also reports fatigue, chills and subjective fever.  Wife was sick this week with similar symptoms.  Patient thought that he may have food poisoning as he ate a lobster roll from a food truck.  Admits to rhinorrhea.       History provided by:  Patient and spouse        Patient History   PAST HISTORY     Reviewed from Nursing Triage:       No past medical history on file.    No past surgical history on file.    No family history on file.    Social History     Tobacco Use    Smoking status: Never    Smokeless tobacco: Never         Review of Systems   REVIEW OF SYSTEMS     Review of Systems   Constitutional:  Positive for chills, fatigue and fever.   HENT:  Positive for rhinorrhea.    Respiratory:  Negative for shortness of breath.    Cardiovascular:  Negative for chest pain.   Gastrointestinal:  Positive for anal bleeding and nausea. Negative for diarrhea and vomiting.   Genitourinary:  Negative for dysuria, flank pain, frequency, hematuria and urgency.   Musculoskeletal:  Positive for arthralgias and myalgias.   Neurological:  Positive for weakness. Negative for syncope, numbness and headaches.         VITALS     ED Vitals      Date/Time Temp Pulse Resp BP SpO2 [facility]   02/09/25 1935 37.2 °C (99 °F) 99 -- -- -- SR   02/09/25 1750 38.2 °C (100.7 °F) 115 -- -- -- DMA   02/09/25 1645 -- 110 16 109/75 97 % DMA   02/09/25 1609 -- 112 -- -- -- GC   02/09/25 1446 -- 120 18 123/70 98 % CC   02/09/25 1230 37.5 °C (99.5 °F) 121 16 119/68 99 % J(S          Pulse Ox %: 99 % (02/09/25 1429)  Pulse Ox Interpretation: Normal (02/09/25 1429)           Physical Exam   PHYSICAL EXAM     Physical Exam  Vitals and nursing note reviewed.    Constitutional:       Appearance: Normal appearance.      Comments: Patient is in no acute distress.  He is lying comfortably on the stretcher.  He answers questions appropriately and speaks in full sentences.  He makes good eye contact throughout examination.   HENT:      Head: Normocephalic and atraumatic.   Eyes:      Conjunctiva/sclera: Conjunctivae normal.   Cardiovascular:      Rate and Rhythm: Regular rhythm. Tachycardia present.   Pulmonary:      Effort: Pulmonary effort is normal.      Breath sounds: Normal breath sounds.   Abdominal:      General: Abdomen is flat.      Palpations: Abdomen is soft.      Tenderness: There is abdominal tenderness.      Comments: LLQ abdominal tenderness to palpation. No guarding or rebound.    Musculoskeletal:         General: Normal range of motion.      Cervical back: Normal range of motion and neck supple.   Skin:     General: Skin is warm and dry.      Capillary Refill: Capillary refill takes less than 2 seconds.   Neurological:      Mental Status: He is alert. Mental status is at baseline.           PROCEDURES     Procedures     DATA     Results       Procedure Component Value Units Date/Time    UA with Reflex Culture [[id number]]  (Abnormal) Collected: 02/09/25 1646    Specimen: Urine, Clean Catch Updated: 02/09/25 1714    Narrative:      The following orders were created for panel order UA with Reflex Culture.  Procedure                               Abnormality         Status                     ---------                               -----------         ------                     UA Reflex to Culture (Ma...[[id number]]  Abnormal            Final result                 Please view results for these tests on the individual orders.    UA Reflex to Culture (Macroscopic) [[id number]]  (Abnormal) Collected: 02/09/25 1646    Specimen: Urine, Clean Catch Updated: 02/09/25 1714     Color, Urine Yellow     Clarity, Urine Clear     Specific Gravity, Urine >1.035     pH, Urine  7.0     Leukocyte Esterase Negative     Comment: Results can be falsely negative due to high specific gravity, some antibiotics, glucose >3 g/dl, or WBC other than neutrophils.        Nitrite, Urine Negative     Protein, Urine Negative     Glucose, Urine Negative mg/dL      Ketones, Urine Negative mg/dL      Urobilinogen, Urine 0.2 EU/dL      Bilirubin, Urine Negative mg/dL      Blood, Urine Negative     Comment: The sensitivity of the occult blood test is equivalent to approximately 4 intact RBC/HPF.       HS Troponin (with 2 hour reflex) [[id number]]  (Normal) Collected: 02/09/25 1448    Specimen: Blood, Venous Updated: 02/09/25 1526     High Sens Troponin I 3.1 pg/mL     Blood Culture Blood, Venous [[id number]] Collected: 02/09/25 1448    Specimen: Blood, Venous Updated: 02/09/25 1502    Blood Culture Blood, Venous [[id number]] Collected: 02/09/25 1448    Specimen: Blood, Venous Updated: 02/09/25 1501    Lactate, w/ reflex repeat if > 2.0 [[id number]]  (Normal) Collected: 02/09/25 1448    Specimen: Blood, Venous Updated: 02/09/25 1501     Lactate 1.6 mmol/L     SARS-COV-2 (COVID-19)/ FLU A/B, AND RSV, PCR Nasopharynx [[id number]]  (Normal) Collected: 02/09/25 1244    Specimen: Nasopharyngeal Swab from Nasopharynx Updated: 02/09/25 1332     SARS-CoV-2 (COVID-19) Negative     Influenza A Negative     Influenza B Negative     Respiratory Syncytial Virus Negative    Narrative:      Testing performed using real-time PCR for detection of COVID-19. EUA approved validation studies performed on site.     Comprehensive metabolic panel [[id number]]  (Abnormal) Collected: 02/09/25 1243    Specimen: Blood, Venous Updated: 02/09/25 1315     Sodium 137 mEQ/L      Potassium 4.3 mEQ/L      Comment: Results obtained on plasma. Plasma Potassium values may be up to 0.4 mEQ/L less than serum values. The differences may be greater for patients with high platelet or white cell counts.        Chloride 103 mEQ/L      CO2 27 mEQ/L      BUN 15  mg/dL      Creatinine 1.1 mg/dL      Glucose 123 mg/dL      Calcium 9.7 mg/dL      AST (SGOT) 23 IU/L      ALT (SGPT) 39 IU/L      Alkaline Phosphatase 39 IU/L      Total Protein 7.5 g/dL      Comment: Test performed on plasma which typically contains approximately 0.4 g/dL more protein than serum.        Albumin 4.6 g/dL      Bilirubin, Total 0.8 mg/dL      eGFR >60.0 mL/min/1.73m*2      Comment: Calculation based on the Chronic Kidney Disease Epidemiology Collaboration (CKD-EPI) equation refit without adjustment for race.        Anion Gap 7 mEQ/L     CBC and differential [[id number]]  (Abnormal) Collected: 02/09/25 1243    Specimen: Blood, Venous Updated: 02/09/25 1252     WBC 14.18 K/uL      RBC 5.40 M/uL      Hemoglobin 17.0 g/dL      Hematocrit 52.2 %      MCV 96.7 fL      MCH 31.5 pg      MCHC 32.6 g/dL      RDW 13.2 %      Platelets 244 K/uL      MPV 9.0 fL      Differential Type Auto     nRBC 0.0 %      Immature Granulocytes 0.4 %      Neutrophils 91.3 %      Lymphocytes 3.5 %      Monocytes 4.2 %      Eosinophils 0.4 %      Basophils 0.2 %      Immature Granulocytes, Absolute 0.05 K/uL      Neutrophils, Absolute 12.96 K/uL      Lymphocytes, Absolute 0.49 K/uL      Monocytes, Absolute 0.59 K/uL      Eosinophils, Absolute 0.06 K/uL      Basophils, Absolute 0.03 K/uL             Imaging Results              CT ABDOMEN PELVIS WITH IV CONTRAST (Final result)  Result time 02/09/25 16:11:34      Final result                   Impression:    IMPRESSION:  1. No acute inflammatory process in the abdomen or pelvis.  2. Unchanged bilateral adrenal nodules, incompletely characterized. However, no  significant interval change suggests adrenal adenoma. These can be further  characterized with a nonemergent enhanced MRI of the abdomen.                 Narrative:    CLINICAL HISTORY: LLQ abdominal pain. Nausea, fatigue, and chills.    COMMENT:  TECHNIQUE: CT of the abdomen and pelvis was performed with intravenous  contrast.  Oral contrast was not given.  CONTRAST: 100mL of iopamidoL (ISOVUE-370) 370 mg iodine /mL (76 %) injection 100  mL  CT DOSE:  One or more dose reduction techniques (e.g. automated exposure  control, adjustment of the mA and/or kV according to patient size, use of  iterative reconstruction technique) utilized for this examination.  COMPARISON: CT abdomen pelvis 4/14/2023.    Findings:  Lung bases: within normal limits.  Lower chest soft tissue: within normal limits.    Liver: within normal limits.  Bile ducts: Normal caliber.  Gallbladder: No calcified gallstones.  Normal caliber wall.  Pancreas: within normal limits.  Spleen: within normal limits.  Adrenals: Unchanged bilateral adrenal nodules measuring 1.5 cm.    Kidneys: within normal limits.  Ureters: No obstructing calculi. Delayed imaging reveals normal proximal  ureteral opacification.  Bladder: within normal limits.    Bowel: Normal caliber. The appendix is visualized and is unremarkable.  Peritoneum: No pneumoperitoneum or ascites.  Reproductive organs:  Prostatomegaly.  Vessels:  within normal limits.  Lymph Nodes:  No enlarged nodes.  Abdominal wall: within normal limits.  Bones: Multilevel degenerative changes of the spine.                                       X-RAY CHEST 1 VIEW (Final result)  Result time 02/09/25 14:41:06      Final result                   Impression:    IMPRESSION:    No active disease is seen in the chest..               Narrative:    CLINICAL HISTORY: Dyspnea.    COMMENT: A single frontal radiograph of the chest is obtained, without  comparison.    The heart is normal in size.  The mediastinum is within normal limits.  The lung fields are clear.  There is no infiltrate, effusion, or pneumothorax.  The regional osseous structures are unremarkable.                                      ECG 12 lead          Scoring tools                                  ED Course & MDM   MDM / ED COURSE / CLINICAL IMPRESSION / DISPO      Medical Decision Making  Problems Addressed:  Bilateral adrenal adenomas: undiagnosed new problem with uncertain prognosis  Fever, unspecified fever cause: acute illness or injury  Nausea: acute illness or injury  Viral illness: acute illness or injury    Amount and/or Complexity of Data Reviewed  Independent Historian: faby  External Data Reviewed: labs and notes.  Labs: ordered. Decision-making details documented in ED Course.  Radiology: ordered. Decision-making details documented in ED Course.  ECG/medicine tests: ordered.    Risk  OTC drugs.  Prescription drug management.        ED Course as of 02/09/25 2308   Sun Feb 09, 2025   1429 SARS-COV-2 (COVID-19)/ FLU A/B, AND RSV, PCR Nasopharynx  Negative COVID/flu/RSV [AS]   1429 Comprehensive metabolic panel(!)  WNL [AS]   1429 CBC and differential(!)  Leukocytosis, no anemia [AS]   1449 X-RAY CHEST 1 VIEW  IMPRESSION:     No active disease is seen in the chest..   [AS]   1504 Lactate: 1.6  WNL [AS]   1532 High Sens Troponin I: 3.1  Sx ongoing since last PM, will discontinue repeat troponin  [AS]   1617 CT ABDOMEN PELVIS WITH IV CONTRAST  IMPRESSION:  1. No acute inflammatory process in the abdomen or pelvis.  2. Unchanged bilateral adrenal nodules, incompletely characterized. However, no  significant interval change suggests adrenal adenoma. These can be further  characterized with a nonemergent enhanced MRI of the abdomen.   [AS]   1636 Updated patient with test results as well as incidental findings.  He reports feeling well.  Awaiting completion of IV fluids and urinalysis. [AS]   1724 UA with Reflex Culture(!)  No nitrites or leukocytes [AS]   1747 Updated patient with test results.  He completed 1 L IV fluids, heart rate remains above 100.  Suggested patient get another liter of fluids in addition to Motrin.  He is agreeable. [AS]   1753 Temp(!): 38.2 °C (100.7 °F)  Motrin ordered [AS]   1938 Vital signs improved, patient reports feeling well.  Will  proceed with discharge. [AS]   1944 Patient is now stable for discharge.  Return precautions discussed, patient verbalizes understanding.  Questions answered prior to discharge.   [AS]      ED Course User Index  [AS] Barb Montilla PA C     Clinical Impression      Viral illness   Nausea   Bilateral adrenal adenomas   Fever, unspecified fever cause     _________________       ED Disposition   Discharge                       Barb Montilla PA C  02/09/25 [id number]

## 2025-02-09 NOTE — PROGRESS NOTES
"Saeid Roman   [id number]    Your doctor has referred you for a   that requires the injection of an iodinated contrast material into your bloodstream. This iodinated contrast material, sometimes referred to as \"x-ray dye\" allows for better interpretation of the x-ray films or CT images and results in a more accurate interpretation of the examination.     Without the use of iodinated contrast (x-ray dye), the examination may be less informative and result in a suboptimal examination, and possibly a delay in diagnosis and, if needed, treatment.     The iodinated contrast material is given through a small needle or catheter placed into a vein, usually on the inside of the elbow, on the back of hand, or in a vein in the foot or lower leg.    The most common, though still rare, potential reaction to an intravenous contrast injection is an allergic-like reaction. Most allergic-like reactions are mild, though a small subset of people can have more severe reactions. Mild reactions include mild / scattered hives, itching, scratchy throat, sneezing and nasal congestion. More severe reactions include facial swelling, severe difficulty breathing, wheezing and anaphylactic shock. Those with a prior history allergic-like reaction to the same class of contrast media (such as CT contrast or MRI contrast) are at the highest risk for an allergic reaction.     If you believe you had an allergic reaction to contrast in the past, please let our staff know. We can determine if this increases your risk for a future reaction and provide steps to decrease the risk. This may delay your examination, but it decreases the risk of having a new and possibly more severe reaction to the contrast injection.    People with a history of prior allergic reactions to other substances (such as unrelated medications and food) and patients with a history of asthma have slightly increased risk for an allergic reaction from contrast material when compared " with the general population, but do not require to be pretreated prior to a contrast injection.    You should notify the physician, nurse or technologist if you have ever had any of these conditions or if you have any questions.

## 2025-02-09 NOTE — ED ATTESTATION NOTE
Physician Attestation:      I have personally seen and examined the patient, participated in the management, and agree with the findings in the above note except as where stated.       I have personally performed the key components of the encounter and provided a substantive portion of the care and medical decision making.     The Physician Assistant and I discussed  the case, workup, and disposition.          Chief Complaint  Chief Complaint   Patient presents with    Weakness - Generalized          My focused history, examination, assessment, and plan of care is as follows:        History  79-year-old male with past medical history significant for prostate cancer, hyperlipidemia presenting for evaluation of generalized weakness.  Last night he started feeling nauseous with subjective fevers, chills and fatigue.  Denies chest pain, vomiting, abdominal pain, dysuria, diarrhea.     Physical  Vital signs reviewed   Awake alert resting comfortably  Heart is tachycardic rate regular rhythm normal S1-S2  Lungs clear to auscultation bilaterally  Abdomen soft with normoactive bowel sounds, left lower quadrant tenderness          MDM / Plan  -Patient historian/Independent historians: Patient  -Prior records reviewed: Labs  -Plan: Labs, CT abdomen     -The patient's chief complaint is an acute problem.     *Refer to ED Workup tab and PA chart for further documentation.               Ebenezer Michaud MD  02/10/25 [id number]

## 2025-02-09 NOTE — DISCHARGE INSTRUCTIONS
Return to the ED for any chest pain, trouble breathing, nausea, sweating, vomiting, cough, fevers, weakness or numbness, or any worsening of symptoms.     Follow up with your healthcare provider for re-evaluation.  You may take Motrin or Tylenol as per the instructions on label as needed for fever.

## 2025-02-13 LAB
BACTERIA BLD CULT: NORMAL
BACTERIA BLD CULT: NORMAL

## 2025-04-04 DIAGNOSIS — M75.121 COMPLETE TEAR OF RIGHT ROTATOR CUFF, UNSPECIFIED WHETHER TRAUMATIC: Primary | ICD-10-CM

## 2025-05-20 DIAGNOSIS — M25.561 ACUTE PAIN OF RIGHT KNEE: Primary | ICD-10-CM

## 2025-05-21 ENCOUNTER — OFFICE VISIT (OUTPATIENT)
Dept: ORTHOPEDICS | Facility: CLINIC | Age: 80
End: 2025-05-21
Payer: MEDICARE

## 2025-05-21 ENCOUNTER — HOSPITAL ENCOUNTER (OUTPATIENT)
Dept: RADIOLOGY | Facility: HOSPITAL | Age: 80
Discharge: HOME | End: 2025-05-21
Payer: MEDICARE

## 2025-05-21 DIAGNOSIS — M25.561 ACUTE PAIN OF RIGHT KNEE: ICD-10-CM

## 2025-05-21 DIAGNOSIS — M17.11 ARTHRITIS OF KNEE, RIGHT: Primary | ICD-10-CM

## 2025-05-21 PROCEDURE — 73564 X-RAY EXAM KNEE 4 OR MORE: CPT | Mod: RT

## 2025-07-29 NOTE — PROGRESS NOTES
[facility]     Patient ID: Saeid Owens is a 80 y.o. male.  1945  Chief Complaint: Saeid returns for reevaluation of his right knee arthritis     HPI: He was seen back in 5/21/2025 and received a Monovisc injection at that visit he is a gentleman who has a history of left knee arthroplasty back in 2016 and the right knee was never as bad and has been treated conservatively for arthritis.  His x-rays have shown degenerative arthritis in the right knee for which we have treated him with artificial lubricant injections.  He received a Monovisc injection back in 2019 and tolerated that well.  Review of systems negative except as stated in above HPI.           Vitals   There were no vitals filed for this visit.      There is no height or weight on file to calculate BMI.     Physical Exam: The left knee has a well-healed incision and just about full extension and flexion to greater than 120     The right knee has some mild synovitis of subtle flexion contracture and flexion to 120 and pain both on the medial and lateral joint lines and patellofemoral crepitus.  He has no instability.  Imaging:     Assessment/Plan: Right knee arthritis.    I advised the patient their diagnosis is that of symptomatic knee arthritis.  I gave them an understanding that arthritis represents rough surfaces on the ends of the bones that meet each other.  This roughness is responsible for pain, mechanical symptoms, and swelling.  I advised that the lining of the knee is known as the synovium and that the synovium can become irritated producing a synovitis, which will also produce swelling in the knee.  I discussed treatment options in great detail inclusive of the following:  (1) simply living with the knee as is, (2) weight loss program, (3) supervised physical therapy and physician-directed exercises to optimize range of motion and strength, (4) assistive devices such as canes or walkers, (5)  braces,  (6) over-the-counter anti-inflammatory medication, (7) prescription medication, (8) steroid injections, (9) artificial lubricant injections, (10) orthobiologic injections including PRP, stem cells, and amniotic cells, (11) arthroscopic surgery for debridement, chondroplasty, and synovectomy, (12) cartilage replacement procedures which could include osteochondral drilling, chondral replacement, and allografts and patches, (12) osteotomies, (13) total knee arthroplasty procedures.  All these options were discussed with the risks and limitations and benefits included.      Right Knee Injection:      I advised the patient that any injection has the potential of causing side effects locally from the needle stick and skin irritation as well as the systemic effects from medication that is utilized.  I advised that complications such as infection are incredibly rare but it is not unusual to have a flareup of pain after an injection which will typically quiet down within a couple days.  That is 1 rationale as to why we do recommend icing following the injection.  The appropriate timeout was taken. We identified and marked the appropriate anatomic landmarks to guide needle placement. The right knee was prepped in the usual sterile fashion and the overlying skin cleaned using isopropyl alcohol   Local anesthesia achieved using Ethyl Chloride spray (Cooling spray). I injected 80 mg of Depo-Medrol and 4 cc of 1 percent lidocaine under sterile conditions into the knee joint and advised the patient that they should ice their knee three times a day for approximately 10 minutes each for about two to three days. The patient tolerated the injection well without complications.  If any untoward effects are noted, they are advised to call my office or any treating physician      Sergio Fernández MD

## 2025-08-05 ENCOUNTER — OFFICE VISIT (OUTPATIENT)
Dept: ORTHOPEDICS | Facility: CLINIC | Age: 80
End: 2025-08-05
Payer: MEDICARE

## 2025-08-05 DIAGNOSIS — M17.11 ARTHRITIS OF KNEE, RIGHT: Primary | ICD-10-CM

## 2025-08-05 PROCEDURE — 99214 OFFICE O/P EST MOD 30 MIN: CPT | Mod: 25 | Performed by: ORTHOPAEDIC SURGERY

## 2025-08-05 PROCEDURE — 20610 DRAIN/INJ JOINT/BURSA W/O US: CPT | Mod: RT | Performed by: ORTHOPAEDIC SURGERY

## 2025-08-05 RX ORDER — METHYLPREDNISOLONE ACETATE 80 MG/ML
80 INJECTION, SUSPENSION INTRA-ARTICULAR; INTRALESIONAL; INTRAMUSCULAR; SOFT TISSUE ONCE
Status: COMPLETED | OUTPATIENT
Start: 2025-08-05 | End: 2025-08-05

## 2025-08-05 RX ADMIN — METHYLPREDNISOLONE ACETATE 80 MG: 80 INJECTION, SUSPENSION INTRA-ARTICULAR; INTRALESIONAL; INTRAMUSCULAR; SOFT TISSUE at 08:37
